# Patient Record
Sex: FEMALE | Race: OTHER | HISPANIC OR LATINO | Employment: OTHER | URBAN - METROPOLITAN AREA
[De-identification: names, ages, dates, MRNs, and addresses within clinical notes are randomized per-mention and may not be internally consistent; named-entity substitution may affect disease eponyms.]

---

## 2017-03-20 ENCOUNTER — HOSPITAL ENCOUNTER (OUTPATIENT)
Dept: RADIOLOGY | Facility: HOSPITAL | Age: 62
Discharge: HOME | End: 2017-03-20
Payer: MEDICARE

## 2017-03-20 ENCOUNTER — TRANSCRIBE ORDERS (OUTPATIENT)
Dept: RADIOLOGY | Facility: HOSPITAL | Age: 62
End: 2017-03-20

## 2017-03-20 DIAGNOSIS — M89.8X5 PAIN IN RIGHT FEMUR: Primary | ICD-10-CM

## 2017-03-20 DIAGNOSIS — M54.5 LOW BACK PAIN, UNSPECIFIED BACK PAIN LATERALITY, UNSPECIFIED CHRONICITY, WITH SCIATICA PRESENCE UNSPECIFIED: ICD-10-CM

## 2017-03-20 DIAGNOSIS — M89.8X5 PAIN IN RIGHT FEMUR: ICD-10-CM

## 2017-03-20 PROCEDURE — 73552 X-RAY EXAM OF FEMUR 2/>: CPT

## 2017-03-20 PROCEDURE — 72170 X-RAY EXAM OF PELVIS: CPT

## 2017-03-20 PROCEDURE — 72110 X-RAY EXAM L-2 SPINE 4/>VWS: CPT

## 2018-07-23 ENCOUNTER — ANESTHESIA EVENT (OUTPATIENT)
Dept: PERIOP | Facility: AMBULARY SURGERY CENTER | Age: 63
End: 2018-07-23
Payer: MEDICARE

## 2018-07-27 RX ORDER — DIAZEPAM 10 MG/1
5 TABLET ORAL EVERY 8 HOURS PRN
COMMUNITY

## 2018-07-27 NOTE — PRE-PROCEDURE INSTRUCTIONS
Pre-Surgery Instructions:   Medication Instructions    diazepam (VALIUM) 10 mg tablet Instructed patient per Anesthesia Guidelines   diclofenac sodium (VOLTAREN) 50 mg EC tablet Patient was instructed by Physician and understands      Pre op instructions reviewed-instructed to get instructions from Dr Minor Madsen office and follow the instructions including the bowel prep

## 2018-08-01 ENCOUNTER — ANESTHESIA (OUTPATIENT)
Dept: PERIOP | Facility: AMBULARY SURGERY CENTER | Age: 63
End: 2018-08-01
Payer: MEDICARE

## 2018-08-01 ENCOUNTER — HOSPITAL ENCOUNTER (OUTPATIENT)
Facility: AMBULARY SURGERY CENTER | Age: 63
Setting detail: OUTPATIENT SURGERY
Discharge: HOME | End: 2018-08-01
Attending: COLON & RECTAL SURGERY | Admitting: COLON & RECTAL SURGERY
Payer: MEDICARE

## 2018-08-01 VITALS
RESPIRATION RATE: 18 BRPM | WEIGHT: 178 LBS | HEART RATE: 60 BPM | SYSTOLIC BLOOD PRESSURE: 139 MMHG | BODY MASS INDEX: 34.95 KG/M2 | HEIGHT: 60 IN | OXYGEN SATURATION: 99 % | TEMPERATURE: 97 F | DIASTOLIC BLOOD PRESSURE: 62 MMHG

## 2018-08-01 DIAGNOSIS — Z12.11 ENCOUNTER FOR SCREENING FOR MALIGNANT NEOPLASM OF COLON: ICD-10-CM

## 2018-08-01 PROCEDURE — 88305 TISSUE EXAM BY PATHOLOGIST: CPT | Performed by: PATHOLOGY

## 2018-08-01 RX ORDER — LIDOCAINE HYDROCHLORIDE 10 MG/ML
INJECTION, SOLUTION INFILTRATION; PERINEURAL AS NEEDED
Status: DISCONTINUED | OUTPATIENT
Start: 2018-08-01 | End: 2018-08-01 | Stop reason: SURG

## 2018-08-01 RX ORDER — SODIUM CHLORIDE, SODIUM LACTATE, POTASSIUM CHLORIDE, CALCIUM CHLORIDE 600; 310; 30; 20 MG/100ML; MG/100ML; MG/100ML; MG/100ML
INJECTION, SOLUTION INTRAVENOUS CONTINUOUS PRN
Status: DISCONTINUED | OUTPATIENT
Start: 2018-08-01 | End: 2018-08-01 | Stop reason: SURG

## 2018-08-01 RX ORDER — PROPOFOL 10 MG/ML
INJECTION, EMULSION INTRAVENOUS AS NEEDED
Status: DISCONTINUED | OUTPATIENT
Start: 2018-08-01 | End: 2018-08-01 | Stop reason: SURG

## 2018-08-01 RX ADMIN — PROPOFOL 30 MG: 10 INJECTION, EMULSION INTRAVENOUS at 08:19

## 2018-08-01 RX ADMIN — SODIUM CHLORIDE, SODIUM LACTATE, POTASSIUM CHLORIDE, AND CALCIUM CHLORIDE: .6; .31; .03; .02 INJECTION, SOLUTION INTRAVENOUS at 08:05

## 2018-08-01 RX ADMIN — PROPOFOL 30 MG: 10 INJECTION, EMULSION INTRAVENOUS at 08:15

## 2018-08-01 RX ADMIN — PROPOFOL 20 MG: 10 INJECTION, EMULSION INTRAVENOUS at 08:22

## 2018-08-01 RX ADMIN — PROPOFOL 20 MG: 10 INJECTION, EMULSION INTRAVENOUS at 08:21

## 2018-08-01 RX ADMIN — LIDOCAINE HYDROCHLORIDE 50 MG: 10 INJECTION, SOLUTION INFILTRATION; PERINEURAL at 08:11

## 2018-08-01 RX ADMIN — PROPOFOL 100 MG: 10 INJECTION, EMULSION INTRAVENOUS at 08:11

## 2018-08-01 RX ADMIN — PROPOFOL 30 MG: 10 INJECTION, EMULSION INTRAVENOUS at 08:13

## 2018-08-01 RX ADMIN — PROPOFOL 20 MG: 10 INJECTION, EMULSION INTRAVENOUS at 08:24

## 2018-08-01 RX ADMIN — PROPOFOL 30 MG: 10 INJECTION, EMULSION INTRAVENOUS at 08:17

## 2018-08-01 NOTE — OP NOTE
Samira Esquivel 66 y o  female MRN: [id number]  :1955  Unit/Bed#: OR POOL Encounter: [id number]  18   @NOW    PCP: Nino Olivia DO    COLONOSCOPY    PROCEDURE: Colonoscopy/ Polypectomny (Cold Biopsy)    INDICATIONS: Screening for Colon Cancer    POST-OP DIAGNOSIS: See the impression below    SEDATION: Monitored anesthesia care, check anesthesia records    PHYSICAL EXAM:    /63   Pulse 66 Comment: SR  Temp (!) 97 3 °F (36 3 °C) (Temporal)   Resp 16   Ht 5' (1 524 m)   Wt 80 7 kg (178 lb)   SpO2 99%   BMI 34 76 kg/m²   Body mass index is 34 76 kg/m²  General: NAD  Heart: S1 & S2 normal, RRR  Lungs: CTA, No rales or rhonchi  Abdomen: Soft, nontender, nondistended, good bowel sounds    CONSENT:  Informed consent was obtained for the procedure, including sedation after explaining the risks and benefits of the procedure  Risks including but not limited to bleeding, perforation, infection, aspiration were discussed in detail  Also explained about less than 100%$ sensitivity with the exam and other alternatives  PREPARATION:   EKG tracing, pulse oximetry, blood pressure were monitored throughout the procedure  Patient was identified by myself both verbally and by visual inspection of ID band  DESCRIPTION:   Patient was placed in the left lateral decubitus position and was sedated with the above medication  Digital rectal examination was performed  The colonoscope was introduced in to the anal canal and advanced up to cecum, which was identified by the appendiceal orifice and IC valve  A careful inspection was made as the colonoscope was withdrawn, including a retroflexed view of the rectum; findings and interventions are described below  Appropriate photodocumentation was obtained  The quality of the colonic preparation was good  FINDINGS:    Small transverse colon polyp  Removed by cold biopsy technique  Placed in jar 1  Small descending colon polyp    Removed by cold biopsy technique  Placed in jar 2  IMPRESSIONS:      Small polyp x2    RECOMMENDATIONS:    Discharge to home  High-fiber diet  Follow up biopsy results in 2 weeks  Recall for colonoscopy in 5 years  COMPLICATIONS:  None; patient tolerated the procedure well      DISPOSITION: PACU           CONDITION: Stable

## 2018-08-01 NOTE — H&P
History and Physical   Colon and Rectal Surgery   Demetris Martinez 66 y o  female MRN: [id number]  Unit/Bed#: OR [place] Encounter: [id number]  18   @NOW    No chief complaint on file  History of Present Illness   HPI:  Demetris Martinez is a 66 y o  female who presents with screening colonoscopy  Denies any current symptoms  Last exam was over 5 years ago  Denies any family history  Historical Information   Past Medical History:   Diagnosis Date    Arthritis     GERD (gastroesophageal reflux disease)     Kidney stone     [facility]eningi[facility])     Patient states she has had no traetment for this     Past Surgical History:   Procedure Laterality Date    ABDOMINAL SURGERY      ANTERIOR CRUCIATE LIGAMENT REPAIR Right     CARPAL TUNNEL RELEASE Bilateral      SECTION      x2    ESOPHAGOGASTRODUODENOSCOPY      KIDNEY STONE SURGERY      ROTATOR CUFF REPAIR Right        Meds/Allergies     Prescriptions Prior to Admission   Medication    diazepam (VALIUM) 10 mg tablet    diclofenac sodium (VOLTAREN) 50 mg EC tablet    naproxen (NAPROSYN) 500 mg tablet       No current facility-administered medications for this encounter  Allergies   Allergen Reactions    Nucynta [Tapentadol] Anaphylaxis    Percocet [Oxycodone-Acetaminophen] Rash         Social History   History   Alcohol Use No     History   Drug Use No     History   Smoking Status    Former Smoker    Quit date:    Smokeless Tobacco    Never Used         Family History: History reviewed  No pertinent family history  Objective     Current Vitals:   Height: 5' (152 4 cm) (18 1359)  Weight - Scale: 82 6 kg (182 lb) (18 1359)  No intake or output data in the 24 hours ending 18 0738    Physical Exam:  General:  Resting comfortably in bed   Eyes:Sclera anicteric  ENT: Trachea midline  Pulm:  Symmetric chest raise    No respiratory Distress  CV:  Regular on monitor  Abdomen:  Soft NT ND  Extremities:  No clubbing/ cyanosis/ edema    Lab Results: I have personally reviewed pertinent lab results  Imaging: I have personally reviewed pertinent reports  ASSESSMENT:  Ginny Garcia is a 66 y o  female who presents for outpatient colonoscopy  PLAN:  For colonoscopy    Risks/ Benefits reviewed to include but not limited to anesthesia, bleeding, missed lesions, and colonoscopic perforation requiring surgery

## 2018-08-01 NOTE — ANESTHESIA POSTPROCEDURE EVALUATION
Post-Op Assessment Note      CV Status:  Stable    Mental Status:  Somnolent    Hydration Status:  Euvolemic    PONV Controlled:  Controlled    Airway Patency:  Patent    Post Op Vitals Reviewed: Yes          Staff: Anesthesiologist       Comments: vss, report to rn          BP      Temp     Pulse     Resp      SpO2

## 2018-08-01 NOTE — DISCHARGE INSTRUCTIONS
Colonoscopy   WHAT YOU NEED TO KNOW:   A colonoscopy is a procedure to examine the inside of your colon (intestine) with a scope  Polyps or tissue growths may have been removed during your colonoscopy  It is normal to feel bloated and to have some abdominal discomfort  You should be passing gas  If you have hemorrhoids or you had polyps removed, you may have a small amount of bleeding  DISCHARGE INSTRUCTIONS:   Seek care immediately if:   · You have a large amount of bright red blood in your bowel movements  · Your abdomen is hard and firm and you have severe pain  · You have sudden trouble breathing  Contact your healthcare provider if:   · You develop a rash or hives  · You have a fever within 24 hours of your procedure       · You have not had a bowel movement for 3 days after your procedure  · You have questions or concerns about your condition or care  Activity:   · Do not lift, strain, or run  for 3 days after your procedure  · Rest after your procedure  You have been given medicine to relax you  Do not  drive or make important decisions until the day after your procedure  Return to your normal activity as directed  · Relieve gas and discomfort from bloating  by lying on your right side with a heating pad on your abdomen  You may need to take short walks to help the gas move out  Eat small meals until bloating is relieved  If you had polyps removed: For 7 days after your procedure:  · Do not  take aspirin  · Do not  go on long car rides  Follow up with your healthcare provider as directed:  Write down your questions so you remember to ask them during your visits  © 2017 [employer] is for End User's use only and may not be sold, redistributed or otherwise used for commercial purposes  All illustrations and images included in CareNotes® are the copyrighted property of [employer]  or Olayinka Castañeda    The above information is an  only  It is not intended as medical advice for individual conditions or treatments  Talk to your doctor, nurse or pharmacist before following any medical regimen to see if it is safe and effective for you  Colorectal Polyps   WHAT YOU NEED TO KNOW:   Colorectal polyps are small growths of tissue in the lining of the colon and rectum  Most polyps are hyperplastic polyps and are usually benign (noncancerous)  Certain types of polyps, called adenomatous polyps, may turn into cancer  DISCHARGE INSTRUCTIONS:   Follow up with your healthcare provider or gastroenterologist as directed: You may need to return for more tests, such as another colonoscopy  Write down your questions so you remember to ask them during your visits  Reduce your risk for colorectal polyps:   · Eat a variety of healthy foods:  Healthy foods include fruit, vegetables, whole-grain breads, low-fat dairy products, beans, lean meat, and fish  Ask if you need to be on a special diet  · Maintain a healthy weight:  Ask your healthcare provider if you need to lose weight and how much you need to lose  Ask for help with a weight loss program     · Exercise:  Begin to exercise slowly and do more as you get stronger  Talk with your healthcare provider before you start an exercise program      · Limit alcohol:  Your risk for polyps increases the more you drink  · Do not smoke: If you smoke, it is never too late to quit  Ask for information about how to stop  For support and more information:   · Mach[employer] [facility]ccle[facility] ([facility]) [address], [place] [address]  Phone: 2- [phone] - [phone]  Web Address: www digestive  niddk nih gov  Contact your healthcare provider or gastroenterologist if:   · You have a fever  · You have chills, a cough, or feel weak and achy  · You have abdominal pain that does not go away or gets worse after you take medicine  · Your abdomen is swollen  · You are losing weight without trying  · You have questions or concerns about your condition or care  Seek care immediately or call 911 if:   · You have sudden shortness of breath  · You have a fast heart rate, fast breathing, or are too dizzy to stand up  · You have severe abdominal pain  · You see blood in your bowel movement  © 2017 [address] [employer] Information is for End User's use only and may not be sold, redistributed or otherwise used for commercial purposes  All illustrations and images included in CareNotes® are the copyrighted property of [employer]  or Olayinka Castañeda  The above information is an  only  It is not intended as medical advice for individual conditions or treatments  Talk to your doctor, nurse or pharmacist before following any medical regimen to see if it is safe and effective for you

## 2018-08-01 NOTE — ANESTHESIA PREPROCEDURE EVALUATION
Review of Systems/Medical History      No history of anesthetic complications     Cardiovascular  Negative cardio ROS    Pulmonary  Negative pulmonary ROS        GI/Hepatic    GERD ,        Kidney stones,        Endo/Other  Negative endo/other ROS      GYN      Comment: Post-menopausal     Hematology  Negative hematology ROS      Musculoskeletal    Arthritis     Neurology      Comment: Meningioma - no issues Psychology   Negative psychology ROS              Physical Exam    Airway       Dental       Cardiovascular  Comment: Negative ROS,     Pulmonary      Other Findings        Anesthesia Plan  ASA Score- 2     Anesthesia Type- IV sedation with anesthesia with ASA Monitors  Additional Monitors:   Airway Plan:     Comment: IV sedation,  standard ASA monitors  Risks and benefits discussed with patient; patient consented and agrees to proceed  I saw and evaluated the patient  If seen with CRNA, we have discussed the anesthetic plan and I am in agreement that the plan is appropriate for the patient        Plan Factors-    Induction- intravenous  Postoperative Plan-     Informed Consent- Anesthetic plan and risks discussed with patient

## 2018-11-29 ENCOUNTER — TRANSCRIBE ORDERS (OUTPATIENT)
Dept: LAB | Facility: HOSPITAL | Age: 63
End: 2018-11-29

## 2018-11-29 ENCOUNTER — APPOINTMENT (OUTPATIENT)
Dept: LAB | Facility: HOSPITAL | Age: 63
End: 2018-11-29
Payer: MEDICARE

## 2018-11-29 DIAGNOSIS — M12.9 ACUTE ARTHROPATHY: Primary | ICD-10-CM

## 2018-11-29 LAB
BASOPHILS # BLD AUTO: 0.04 THOUSANDS/ΜL (ref 0–0.1)
BASOPHILS NFR BLD AUTO: 1 % (ref 0–1)
CK SERPL-CCNC: 112 U/L (ref 26–192)
CRP SERPL QL: 8.5 MG/L
EOSINOPHIL # BLD AUTO: 0.28 THOUSAND/ΜL (ref 0–0.61)
EOSINOPHIL NFR BLD AUTO: 3 % (ref 0–6)
ERYTHROCYTE [DISTWIDTH] IN BLOOD BY AUTOMATED COUNT: 13.8 % (ref 11.6–15.1)
HCT VFR BLD AUTO: 42.5 % (ref 34.8–46.1)
HGB BLD-MCNC: 13.7 G/DL (ref 11.5–15.4)
IMM GRANULOCYTES # BLD AUTO: 0.03 THOUSAND/UL (ref 0–0.2)
IMM GRANULOCYTES NFR BLD AUTO: 0 % (ref 0–2)
LYMPHOCYTES # BLD AUTO: 2.86 THOUSANDS/ΜL (ref 0.6–4.47)
LYMPHOCYTES NFR BLD AUTO: 33 % (ref 14–44)
MCH RBC QN AUTO: 28.6 PG (ref 26.8–34.3)
MCHC RBC AUTO-ENTMCNC: 32.2 G/DL (ref 31.4–37.4)
MCV RBC AUTO: 89 FL (ref 82–98)
MONOCYTES # BLD AUTO: 0.6 THOUSAND/ΜL (ref 0.17–1.22)
MONOCYTES NFR BLD AUTO: 7 % (ref 4–12)
NEUTROPHILS # BLD AUTO: 4.94 THOUSANDS/ΜL (ref 1.85–7.62)
NEUTS SEG NFR BLD AUTO: 56 % (ref 43–75)
NRBC BLD AUTO-RTO: 0 /100 WBCS
PLATELET # BLD AUTO: 369 THOUSANDS/UL (ref 149–390)
PMV BLD AUTO: 9.4 FL (ref 8.9–12.7)
RBC # BLD AUTO: 4.79 MILLION/UL (ref 3.81–5.12)
TSH SERPL DL<=0.05 MIU/L-ACNC: 1.51 UIU/ML (ref 0.36–3.74)
WBC # BLD AUTO: 8.75 THOUSAND/UL (ref 4.31–10.16)

## 2018-11-29 PROCEDURE — 82550 ASSAY OF CK (CPK): CPT

## 2018-11-29 PROCEDURE — 86140 C-REACTIVE PROTEIN: CPT

## 2018-11-29 PROCEDURE — 36415 COLL VENOUS BLD VENIPUNCTURE: CPT

## 2018-11-29 PROCEDURE — 85025 COMPLETE CBC W/AUTO DIFF WBC: CPT

## 2018-11-29 PROCEDURE — 84443 ASSAY THYROID STIM HORMONE: CPT

## 2019-06-09 ENCOUNTER — HOSPITAL ENCOUNTER (EMERGENCY)
Facility: HOSPITAL | Age: 64
Discharge: HOME | End: 2019-06-09
Attending: EMERGENCY MEDICINE | Admitting: EMERGENCY MEDICINE
Payer: MEDICARE

## 2019-06-09 VITALS
TEMPERATURE: 97 F | OXYGEN SATURATION: 98 % | RESPIRATION RATE: 18 BRPM | HEART RATE: 69 BPM | DIASTOLIC BLOOD PRESSURE: 93 MMHG | SYSTOLIC BLOOD PRESSURE: 125 MMHG

## 2019-06-09 DIAGNOSIS — S01.01XA LACERATION OF SCALP, INITIAL ENCOUNTER: Primary | ICD-10-CM

## 2019-06-09 PROCEDURE — 90715 TDAP VACCINE 7 YRS/> IM: CPT | Performed by: EMERGENCY MEDICINE

## 2019-06-09 PROCEDURE — 99282 EMERGENCY DEPT VISIT SF MDM: CPT

## 2019-06-09 PROCEDURE — 99283 EMERGENCY DEPT VISIT LOW MDM: CPT | Performed by: EMERGENCY MEDICINE

## 2019-06-09 PROCEDURE — 90471 IMMUNIZATION ADMIN: CPT

## 2019-06-09 RX ORDER — LIDOCAINE HYDROCHLORIDE 10 MG/ML
5 INJECTION, SOLUTION EPIDURAL; INFILTRATION; INTRACAUDAL; PERINEURAL ONCE
Status: COMPLETED | OUTPATIENT
Start: 2019-06-09 | End: 2019-06-09

## 2019-06-09 RX ADMIN — LIDOCAINE HYDROCHLORIDE 5 ML: 10 INJECTION, SOLUTION EPIDURAL; INFILTRATION; INTRACAUDAL; PERINEURAL at 21:59

## 2019-06-09 RX ADMIN — TETANUS TOXOID, REDUCED DIPHTHERIA TOXOID AND ACELLULAR PERTUSSIS VACCINE, ADSORBED 0.5 ML: 5; 2.5; 8; 8; 2.5 SUSPENSION INTRAMUSCULAR at 22:00

## 2020-07-30 ENCOUNTER — HOSPITAL ENCOUNTER (EMERGENCY)
Facility: HOSPITAL | Age: 65
Discharge: HOME | End: 2020-07-31
Attending: EMERGENCY MEDICINE | Admitting: EMERGENCY MEDICINE
Payer: MEDICARE

## 2020-07-30 VITALS
DIASTOLIC BLOOD PRESSURE: 79 MMHG | OXYGEN SATURATION: 99 % | SYSTOLIC BLOOD PRESSURE: 167 MMHG | WEIGHT: 178 LBS | HEART RATE: 76 BPM | RESPIRATION RATE: 18 BRPM | BODY MASS INDEX: 34.76 KG/M2 | TEMPERATURE: 98.1 F

## 2020-07-30 DIAGNOSIS — K08.89 DENTALGIA: Primary | ICD-10-CM

## 2020-07-30 PROCEDURE — 99284 EMERGENCY DEPT VISIT MOD MDM: CPT | Performed by: EMERGENCY MEDICINE

## 2020-07-30 PROCEDURE — 99282 EMERGENCY DEPT VISIT SF MDM: CPT

## 2020-07-30 RX ORDER — IBUPROFEN 400 MG/1
400 TABLET ORAL EVERY 6 HOURS PRN
Qty: 90 TABLET | Refills: 0 | Status: SHIPPED | OUTPATIENT
Start: 2020-07-30

## 2020-07-30 RX ORDER — CHLORHEXIDINE GLUCONATE 0.12 MG/ML
15 RINSE ORAL 2 TIMES DAILY
Qty: 120 ML | Refills: 0 | Status: SHIPPED | OUTPATIENT
Start: 2020-07-30

## 2020-07-31 NOTE — ED ATTENDING ATTESTATION
7/30/2020  Eloy Marvin DO, saw and evaluated the patient  I have discussed the patient with the resident/non-physician practitioner and agree with the resident's/non-physician practitioner's findings, Plan of Care, and MDM as documented in the resident's/non-physician practitioner's note, except where noted  All available labs and Radiology studies were reviewed  I was present for key portions of any procedure(s) performed by the resident/non-physician practitioner and I was immediately available to provide assistance  At this point I agree with the current assessment done in the Emergency Department  I have conducted an independent evaluation of this patient a history and physical is as follows:    62 yo female c/o over 1 mo R upper toothache near #7, worse over the past couple days  Pain severe at times, worse with hot/cold exposure  Denies fever, swelling, difficulty eating, swallowing, or breathing  #7 is TTP  No obvious intraoral abscess  No trismus  Neck supple no meningismus  No stridor  Submandibular space is soft  Uvula midline  No facial swelling  Imp: toothache, unclear exact etiology  Likely carries plan: peridex, ibuprofen  Pt given information to call dental clinic        ED Course         Critical Care Time  Procedures

## 2020-07-31 NOTE — ED PROVIDER NOTES
History  Chief Complaint   Patient presents with    Dental Pain     Began with dental pain (top right) approximately 4 weeks ago  Pain has significantly increased in the past 24 hours  Has tried over the coutre medications without relief  Having difficulty getting an appointment with a dentist ( one month out)  61yo female presents for dental pain  She initially injured her tooth while eating eating about month ago, she previously had a filling in her right upper molar tooth however states it began to fall out with the injury  She has been using tylenol for pain however today had increased pain  Family tried using oragel today  She states radiation to forehead and ear  No facial swelling or erythema  Denies fevers, chill, difficulty swallowing, difficulty breathing, difficulty turning neck or opening mouth  She tried calling a dentist today however cannot be seen for weeks  Prior to Admission Medications   Prescriptions Last Dose Informant Patient Reported?  Taking?   diazepam (VALIUM) 10 mg tablet   Yes No   Sig: Take 5 mg by mouth every 8 (eight) hours as needed for anxiety   diclofenac sodium (VOLTAREN) 50 mg EC tablet   Yes No   Sig: Take 50 mg by mouth 2 (two) times a day   naproxen (NAPROSYN) 500 mg tablet   No No   Sig: Take 1 tablet by mouth 2 (two) times a day with meals for 15 days   Patient taking differently: Take 500 mg by mouth as needed        Facility-Administered Medications: None       Past Medical History:   Diagnosis Date    Arthritis     GERD (gastroesophageal reflux disease)     Kidney stone     Meningioma [facility])     Patient states she has had no traetment for this       Past Surgical History:   Procedure Laterality Date    ABDOMINAL SURGERY      ANTERIOR CRUCIATE LIGAMENT REPAIR Right     CARPAL TUNNEL RELEASE Bilateral      SECTION      x2    ESOPHAGOGASTRODUODENOSCOPY      KIDNEY STONE SURGERY      [facility] COLONOSCOPY FLX DX W/COLLJ SPEC WHEN PFRMD N/A 2018 Procedure: COLONOSCOPY;  Surgeon: Dimas Dugan MD;  [facility]; Service: Colorectal    ROTATOR CUFF REPAIR Right        History reviewed  No pertinent family history  I have reviewed and agree with the history as documented  E-Cigarette/Vaping    E-Cigarette Use Never User      E-Cigarette/Vaping Substances     Social History     Tobacco Use    Smoking status: Former Smoker     Last attempt to quit:      Years since quittin 5    Smokeless tobacco: Never Used   Substance Use Topics    Alcohol use: No    Drug use: No        Review of Systems   Constitutional: Negative for appetite change, chills and fever  HENT: Positive for dental problem and ear pain  Negative for facial swelling and trouble swallowing  Respiratory: Negative for shortness of breath  Cardiovascular: Negative for chest pain  Gastrointestinal: Negative for nausea and vomiting  Musculoskeletal: Negative for neck pain and neck stiffness  Skin: Negative for color change  All other systems reviewed and are negative  Physical Exam  ED Triage Vitals   Temperature Pulse Respirations Blood Pressure SpO2   209 20 2325 20 2325 20 23220   98 1 °F (36 7 °C) 76 18 167/79 99 %      Temp Source Heart Rate Source Patient Position - Orthostatic VS BP Location FiO2 (%)   20 -- -- -- --   Oral          Pain Score       20       Worst Possible Pain             Orthostatic Vital Signs  Vitals:    20   BP: 167/79   Pulse: 76       Physical Exam   Constitutional: She appears well-developed and well-nourished  No distress  HENT:   Head: Normocephalic and atraumatic  Right Ear: Tympanic membrane, external ear and ear canal normal    Left Ear: Tympanic membrane, external ear and ear canal normal    Nose: Nose normal    Mouth/Throat: Uvula is midline and oropharynx is clear and moist  No oral lesions  No trismus in the jaw  Abnormal dentition  No uvula swelling  No posterior oropharyngeal edema, posterior oropharyngeal erythema or tonsillar abscesses  No tonsillar exudate  Abnormal dentition with several missing teeth, pain around right upper molar area to tooth #1-2 positioning, no palpable abscess felt, soft sublingual space   Eyes: Right eye exhibits no discharge  Left eye exhibits no discharge  No scleral icterus  Cardiovascular: Normal rate and regular rhythm  Pulmonary/Chest: Effort normal and breath sounds normal  No respiratory distress  Neurological: She is alert  She exhibits normal muscle tone  Coordination normal    Skin: Skin is warm  She is not diaphoretic  No erythema  Vitals reviewed  ED Medications  Medications - No data to display    Diagnostic Studies  Results Reviewed     None                 No orders to display         Procedures  Procedures      ED Course                                           MDM  Number of Diagnoses or Management Options  Dentalgia:   Diagnosis management comments: 61yo female presents for dental pain, she had orginally injured her tooth about a month ago and today experienced increased pain  She has no concerning signs/symptoms such as facial swelling, erythema, trismus, difficulty swallowing or breathing, fevers, chills  Patient was offered nerve block however declined  She was advised to use ibuprofen for pain control and given rx for peridex  She was provided  dental clinic resource page and discharged to home  Disposition  Final diagnoses:   Manuel Franco     Time reflects when diagnosis was documented in both MDM as applicable and the Disposition within this note     Time User Action Codes Description Comment    7/30/2020 11:25 PM Jocelyne Fisher Add [M80 [id number]] Manuel Franco       ED Disposition     ED Disposition Condition Date/Time Comment    Discharge Stable u Jul 30, 2020 11:25 PM Yessi Jacobs discharge to home/self care              Follow-up Information     Follow up With Specialties Details Why Mian Zimmerman [address]  [phone]          Discharge Medication List as of 7/30/2020 11:50 PM      START taking these medications    Details   chlorhexidine (PERIDEX) 0 12 % solution Apply 15 mL to the mouth or throat 2 (two) times a day, Starting u 7/30/2020, Print      ibuprofen (MOTRIN) 400 mg tablet Take 1 tablet (400 mg total) by mouth every 6 (six) hours as needed for mild pain or moderate pain, Starting u 7/30/2020, Print         CONTINUE these medications which have NOT CHANGED    Details   diazepam (VALIUM) 10 mg tablet Take 5 mg by mouth every 8 (eight) hours as needed for anxiety, Historical Med      diclofenac sodium (VOLTAREN) 50 mg EC tablet Take 50 mg by mouth 2 (two) times a day, Historical Med      naproxen (NAPROSYN) 500 mg tablet Take 1 tablet by mouth 2 (two) times a day with meals for 15 days, Starting 12/25/2016, Until Mon 1/9/17, Print           No discharge procedures on file  PDMP Review     None           ED Provider  Attending physically available and evaluated Amilcar Diane I managed the patient along with the ED Attending      Electronically Signed by         Ritika Mejia DO  07/31/20 [id number]

## 2021-02-10 ENCOUNTER — HOSPITAL ENCOUNTER (OUTPATIENT)
Dept: RADIOLOGY | Facility: HOSPITAL | Age: 66
Discharge: HOME | End: 2021-02-10
Payer: MEDICARE

## 2021-02-10 ENCOUNTER — TRANSCRIBE ORDERS (OUTPATIENT)
Dept: RADIOLOGY | Facility: HOSPITAL | Age: 66
End: 2021-02-10

## 2021-02-10 DIAGNOSIS — R05.9 COUGH: ICD-10-CM

## 2021-02-10 DIAGNOSIS — R05.9 COUGH: Primary | ICD-10-CM

## 2021-02-10 PROCEDURE — 71046 X-RAY EXAM CHEST 2 VIEWS: CPT

## 2022-03-07 ENCOUNTER — HOSPITAL ENCOUNTER (OUTPATIENT)
Dept: RADIOLOGY | Age: 67
Discharge: HOME | End: 2022-03-07
Payer: MEDICARE

## 2022-03-07 DIAGNOSIS — N20.0 CALCULUS OF KIDNEY: ICD-10-CM

## 2022-03-07 PROCEDURE — 76770 US EXAM ABDO BACK WALL COMP: CPT

## 2022-09-01 ENCOUNTER — OFFICE VISIT (OUTPATIENT)
Dept: DENTISTRY | Facility: CLINIC | Age: 67
End: 2022-09-01

## 2022-09-01 VITALS — SYSTOLIC BLOOD PRESSURE: 136 MMHG | DIASTOLIC BLOOD PRESSURE: 78 MMHG

## 2022-09-01 DIAGNOSIS — Z00.00 ENCOUNTER FOR SCREENING AND PREVENTATIVE CARE: Primary | ICD-10-CM

## 2022-09-01 PROBLEM — M54.9 DORSALGIA: Status: ACTIVE | Noted: 2022-09-01

## 2022-09-01 PROBLEM — M19.90 OSTEOARTHRITIS: Status: ACTIVE | Noted: 2017-02-28

## 2022-09-01 PROBLEM — R05.9 COUGH: Status: ACTIVE | Noted: 2022-09-01

## 2022-09-01 PROBLEM — J32.9 SINUSITIS: Status: ACTIVE | Noted: 2022-09-01

## 2022-09-01 PROBLEM — K21.9 GASTRO-ESOPHAGEAL REFLUX DISEASE WITHOUT ESOPHAGITIS: Status: ACTIVE | Noted: 2022-09-01

## 2022-09-01 PROCEDURE — D0220 INTRAORAL - PERIAPICAL FIRST RADIOGRAPHIC IMAGE: HCPCS

## 2022-09-01 RX ORDER — TAMSULOSIN HYDROCHLORIDE 0.4 MG/1
1 CAPSULE ORAL DAILY
COMMUNITY
Start: 2022-05-31

## 2022-09-01 RX ORDER — AMOXICILLIN 500 MG/1
500 CAPSULE ORAL EVERY 8 HOURS SCHEDULED
COMMUNITY

## 2022-09-01 RX ORDER — IBUPROFEN 600 MG/1
600 TABLET ORAL EVERY 6 HOURS PRN
COMMUNITY

## 2022-09-01 RX ORDER — MELOXICAM 7.5 MG/1
TABLET ORAL
COMMUNITY
Start: 2022-08-03

## 2022-09-01 NOTE — PROGRESS NOTES
Reviewed medical history with pt  CC: terrible pain #28  Saw private dentist who took pa, gave script Amoxicillin 500 mg and ibuprofen 600mg  Hx  Throwing up, can't sleep at night    Pa #28, Limited exam  Dr Cervantes Neither examined: we can do ext here in clinic  Once healed, can schedule for Comp , fmx  No swelling, EO exam: enlarged submandibular lymph node  Instructed pt to finish taking ab  NV: ext   #28  NVV: Comp exam, FMX, PC

## 2022-09-26 ENCOUNTER — OFFICE VISIT (OUTPATIENT)
Dept: DENTISTRY | Facility: CLINIC | Age: 67
End: 2022-09-26

## 2022-09-26 VITALS — HEART RATE: 71 BPM | SYSTOLIC BLOOD PRESSURE: 129 MMHG | DIASTOLIC BLOOD PRESSURE: 65 MMHG

## 2022-09-26 DIAGNOSIS — Z01.20 ENCOUNTER FOR DENTAL EXAMINATION: Primary | ICD-10-CM

## 2022-09-26 PROCEDURE — D7140 EXTRACTION, ERUPTED TOOTH OR EXPOSED ROOT (ELEVATION AND/OR FORCEPS REMOVAL): HCPCS | Performed by: DENTIST

## 2022-09-26 NOTE — PROGRESS NOTES
Oral Surgery    Pancho Alejandro presents for Ext 29    [address], patient denies any changes  Obtained a direct and personal consent  Risks and complications were explained  Pt agreed and consented  Consent scanned in doc center  Pre-Op BP WNL  Administered cc of 2 % Lidocaine w/ 1:100,000 epi via block and 1 5 cc of 4% articaine with 1:100,000 epi via local infiltration  Adequate anesthesia obtained, reflected gingiva, elevated, and extracted #28  Socket irrigated, and gauze applied  Upon dismissal, patient received POI, ice, gauze       NV: comp exam

## 2022-10-31 PROBLEM — R05.9 COUGH: Status: RESOLVED | Noted: 2022-09-01 | Resolved: 2022-10-31

## 2022-10-31 PROBLEM — J32.9 SINUSITIS: Status: RESOLVED | Noted: 2022-09-01 | Resolved: 2022-10-31

## 2022-11-18 ENCOUNTER — APPOINTMENT (OUTPATIENT)
Dept: LAB | Facility: CLINIC | Age: 67
End: 2022-11-18

## 2022-11-18 ENCOUNTER — HOSPITAL ENCOUNTER (OUTPATIENT)
Dept: RADIOLOGY | Facility: HOSPITAL | Age: 67
Discharge: HOME | End: 2022-11-18

## 2022-11-18 DIAGNOSIS — Z13.228 SCREENING FOR PHENYLKETONURIA (PKU): ICD-10-CM

## 2022-11-18 DIAGNOSIS — R31.9 HEMATURIA SYNDROME: ICD-10-CM

## 2022-11-18 DIAGNOSIS — Z13.6 SCREENING FOR ISCHEMIC HEART DISEASE: ICD-10-CM

## 2022-11-18 DIAGNOSIS — R81 GLYCOSURIA: ICD-10-CM

## 2022-11-18 LAB
ALBUMIN SERPL BCP-MCNC: 3.2 G/DL (ref 3.5–5)
ALP SERPL-CCNC: 101 U/L (ref 46–116)
ALT SERPL W P-5'-P-CCNC: 24 U/L (ref 12–78)
ANION GAP SERPL CALCULATED.3IONS-SCNC: 4 MMOL/L (ref 4–13)
AST SERPL W P-5'-P-CCNC: 14 U/L (ref 5–45)
BASOPHILS # BLD AUTO: 0.03 THOUSANDS/ÂΜL (ref 0–0.1)
BASOPHILS NFR BLD AUTO: 0 % (ref 0–1)
BILIRUB SERPL-MCNC: 0.45 MG/DL (ref 0.2–1)
BUN SERPL-MCNC: 12 MG/DL (ref 5–25)
CALCIUM ALBUM COR SERPL-MCNC: 9.4 MG/DL (ref 8.3–10.1)
CALCIUM SERPL-MCNC: 8.8 MG/DL (ref 8.3–10.1)
CHLORIDE SERPL-SCNC: 105 MMOL/L (ref 96–108)
CHOLEST SERPL-MCNC: 212 MG/DL
CO2 SERPL-SCNC: 29 MMOL/L (ref 21–32)
CREAT SERPL-MCNC: 0.73 MG/DL (ref 0.6–1.3)
EOSINOPHIL # BLD AUTO: 0.26 THOUSAND/ÂΜL (ref 0–0.61)
EOSINOPHIL NFR BLD AUTO: 3 % (ref 0–6)
ERYTHROCYTE [DISTWIDTH] IN BLOOD BY AUTOMATED COUNT: 13.2 % (ref 11.6–15.1)
EST. AVERAGE GLUCOSE BLD GHB EST-MCNC: 217 MG/DL
GFR SERPL CREATININE-BSD FRML MDRD: 86 ML/MIN/1.73SQ M
GLUCOSE P FAST SERPL-MCNC: 181 MG/DL (ref 65–99)
HBA1C MFR BLD: 9.2 %
HCT VFR BLD AUTO: 42.6 % (ref 34.8–46.1)
HDLC SERPL-MCNC: 52 MG/DL
HGB BLD-MCNC: 14 G/DL (ref 11.5–15.4)
IMM GRANULOCYTES # BLD AUTO: 0.03 THOUSAND/UL (ref 0–0.2)
IMM GRANULOCYTES NFR BLD AUTO: 0 % (ref 0–2)
LDLC SERPL CALC-MCNC: 136 MG/DL (ref 0–100)
LYMPHOCYTES # BLD AUTO: 3.14 THOUSANDS/ÂΜL (ref 0.6–4.47)
LYMPHOCYTES NFR BLD AUTO: 32 % (ref 14–44)
MCH RBC QN AUTO: 28.9 PG (ref 26.8–34.3)
MCHC RBC AUTO-ENTMCNC: 32.9 G/DL (ref 31.4–37.4)
MCV RBC AUTO: 88 FL (ref 82–98)
MONOCYTES # BLD AUTO: 0.64 THOUSAND/ÂΜL (ref 0.17–1.22)
MONOCYTES NFR BLD AUTO: 7 % (ref 4–12)
NEUTROPHILS # BLD AUTO: 5.7 THOUSANDS/ÂΜL (ref 1.85–7.62)
NEUTS SEG NFR BLD AUTO: 58 % (ref 43–75)
NONHDLC SERPL-MCNC: 160 MG/DL
NRBC BLD AUTO-RTO: 0 /100 WBCS
PLATELET # BLD AUTO: 382 THOUSANDS/UL (ref 149–390)
PMV BLD AUTO: 9 FL (ref 8.9–12.7)
POTASSIUM SERPL-SCNC: 4.3 MMOL/L (ref 3.5–5.3)
PROT SERPL-MCNC: 7 G/DL (ref 6.4–8.4)
RBC # BLD AUTO: 4.85 MILLION/UL (ref 3.81–5.12)
SODIUM SERPL-SCNC: 138 MMOL/L (ref 135–147)
TRIGL SERPL-MCNC: 118 MG/DL
WBC # BLD AUTO: 9.8 THOUSAND/UL (ref 4.31–10.16)

## 2023-04-28 ENCOUNTER — APPOINTMENT (EMERGENCY)
Dept: RADIOLOGY | Facility: HOSPITAL | Age: 68
End: 2023-04-28

## 2023-04-28 ENCOUNTER — ANESTHESIA EVENT (OUTPATIENT)
Dept: PERIOP | Facility: HOSPITAL | Age: 68
End: 2023-04-28

## 2023-04-28 ENCOUNTER — ANESTHESIA (OUTPATIENT)
Dept: PERIOP | Facility: HOSPITAL | Age: 68
End: 2023-04-28

## 2023-04-28 ENCOUNTER — HOSPITAL ENCOUNTER (OUTPATIENT)
Facility: HOSPITAL | Age: 68
Setting detail: OUTPATIENT SURGERY
Discharge: HOME | End: 2023-04-29
Attending: EMERGENCY MEDICINE | Admitting: SURGERY

## 2023-04-28 DIAGNOSIS — K81.0 CHOLECYSTITIS, ACUTE: Primary | ICD-10-CM

## 2023-04-28 DIAGNOSIS — K80.20 CHOLELITHIASIS: ICD-10-CM

## 2023-04-28 DIAGNOSIS — R10.13 ACUTE EPIGASTRIC PAIN: ICD-10-CM

## 2023-04-28 DIAGNOSIS — K80.50 BILIARY COLIC: ICD-10-CM

## 2023-04-28 DIAGNOSIS — K80.00 ACUTE CALCULOUS CHOLECYSTITIS: ICD-10-CM

## 2023-04-28 LAB
2HR DELTA HS TROPONIN: >2 NG/L
4HR DELTA HS TROPONIN: >2 NG/L
ALBUMIN SERPL BCP-MCNC: 3.5 G/DL (ref 3.5–5)
ALP SERPL-CCNC: 132 U/L (ref 46–116)
ALT SERPL W P-5'-P-CCNC: 34 U/L (ref 12–78)
ANION GAP SERPL CALCULATED.3IONS-SCNC: 3 MMOL/L (ref 4–13)
AST SERPL W P-5'-P-CCNC: 22 U/L (ref 5–45)
ATRIAL RATE: 72 BPM
BACTERIA UR QL AUTO: ABNORMAL /HPF
BASOPHILS # BLD AUTO: 0.04 THOUSANDS/ΜL (ref 0–0.1)
BASOPHILS NFR BLD AUTO: 0 % (ref 0–1)
BILIRUB SERPL-MCNC: 0.25 MG/DL (ref 0.2–1)
BILIRUB UR QL STRIP: NEGATIVE
BUN SERPL-MCNC: 14 MG/DL (ref 5–25)
CALCIUM SERPL-MCNC: 9.8 MG/DL (ref 8.3–10.1)
CARDIAC TROPONIN I PNL SERPL HS: 4 NG/L
CARDIAC TROPONIN I PNL SERPL HS: 4 NG/L
CARDIAC TROPONIN I PNL SERPL HS: <2 NG/L
CHLORIDE SERPL-SCNC: 102 MMOL/L (ref 96–108)
CLARITY UR: CLEAR
CO2 SERPL-SCNC: 28 MMOL/L (ref 21–32)
COLOR UR: YELLOW
CREAT SERPL-MCNC: 0.8 MG/DL (ref 0.6–1.3)
EOSINOPHIL # BLD AUTO: 0.26 THOUSAND/ΜL (ref 0–0.61)
EOSINOPHIL NFR BLD AUTO: 3 % (ref 0–6)
ERYTHROCYTE [DISTWIDTH] IN BLOOD BY AUTOMATED COUNT: 13 % (ref 11.6–15.1)
GFR SERPL CREATININE-BSD FRML MDRD: 77 ML/MIN/1.73SQ M
GLUCOSE SERPL-MCNC: 290 MG/DL (ref 65–140)
GLUCOSE UR STRIP-MCNC: ABNORMAL MG/DL
HCT VFR BLD AUTO: 44.6 % (ref 34.8–46.1)
HGB BLD-MCNC: 14.7 G/DL (ref 11.5–15.4)
HGB UR QL STRIP.AUTO: ABNORMAL
IMM GRANULOCYTES # BLD AUTO: 0.03 THOUSAND/UL (ref 0–0.2)
IMM GRANULOCYTES NFR BLD AUTO: 0 % (ref 0–2)
KETONES UR STRIP-MCNC: NEGATIVE MG/DL
LEUKOCYTE ESTERASE UR QL STRIP: NEGATIVE
LIPASE SERPL-CCNC: 134 U/L (ref 73–393)
LYMPHOCYTES # BLD AUTO: 2.21 THOUSANDS/ΜL (ref 0.6–4.47)
LYMPHOCYTES NFR BLD AUTO: 22 % (ref 14–44)
MCH RBC QN AUTO: 28.6 PG (ref 26.8–34.3)
MCHC RBC AUTO-ENTMCNC: 33 G/DL (ref 31.4–37.4)
MCV RBC AUTO: 87 FL (ref 82–98)
MONOCYTES # BLD AUTO: 0.57 THOUSAND/ΜL (ref 0.17–1.22)
MONOCYTES NFR BLD AUTO: 6 % (ref 4–12)
MUCOUS THREADS UR QL AUTO: ABNORMAL
NEUTROPHILS # BLD AUTO: 7.08 THOUSANDS/ΜL (ref 1.85–7.62)
NEUTS SEG NFR BLD AUTO: 69 % (ref 43–75)
NITRITE UR QL STRIP: NEGATIVE
NON-SQ EPI CELLS URNS QL MICRO: ABNORMAL /HPF
NRBC BLD AUTO-RTO: 0 /100 WBCS
P AXIS: 30 DEGREES
PH UR STRIP.AUTO: 6.5 [PH] (ref 4.5–8)
PLATELET # BLD AUTO: 382 THOUSANDS/UL (ref 149–390)
PMV BLD AUTO: 9.7 FL (ref 8.9–12.7)
POTASSIUM SERPL-SCNC: 4 MMOL/L (ref 3.5–5.3)
PR INTERVAL: 134 MS
PROT SERPL-MCNC: 7.5 G/DL (ref 6.4–8.4)
PROT UR STRIP-MCNC: NEGATIVE MG/DL
QRS AXIS: 63 DEGREES
QRSD INTERVAL: 74 MS
QT INTERVAL: 368 MS
QTC INTERVAL: 402 MS
RBC # BLD AUTO: 5.14 MILLION/UL (ref 3.81–5.12)
RBC #/AREA URNS AUTO: ABNORMAL /HPF
SODIUM SERPL-SCNC: 133 MMOL/L (ref 135–147)
SP GR UR STRIP.AUTO: >=1.03 (ref 1–1.03)
T WAVE AXIS: 42 DEGREES
UROBILINOGEN UR QL STRIP.AUTO: 0.2 E.U./DL
VENTRICULAR RATE: 72 BPM
WBC # BLD AUTO: 10.19 THOUSAND/UL (ref 4.31–10.16)
WBC #/AREA URNS AUTO: ABNORMAL /HPF

## 2023-04-28 RX ORDER — HEPARIN SODIUM 5000 [USP'U]/ML
5000 INJECTION, SOLUTION INTRAVENOUS; SUBCUTANEOUS EVERY 8 HOURS SCHEDULED
Status: DISCONTINUED | OUTPATIENT
Start: 2023-04-28 | End: 2023-04-29 | Stop reason: HOSPADM

## 2023-04-28 RX ORDER — ONDANSETRON 2 MG/ML
4 INJECTION INTRAMUSCULAR; INTRAVENOUS ONCE AS NEEDED
Status: CANCELLED | OUTPATIENT
Start: 2023-04-28

## 2023-04-28 RX ORDER — LABETALOL HYDROCHLORIDE 5 MG/ML
INJECTION, SOLUTION INTRAVENOUS AS NEEDED
Status: DISCONTINUED | OUTPATIENT
Start: 2023-04-28 | End: 2023-04-28

## 2023-04-28 RX ORDER — KETOROLAC TROMETHAMINE 30 MG/ML
15 INJECTION, SOLUTION INTRAMUSCULAR; INTRAVENOUS ONCE
Status: COMPLETED | OUTPATIENT
Start: 2023-04-28 | End: 2023-04-28

## 2023-04-28 RX ORDER — MAGNESIUM HYDROXIDE 1200 MG/15ML
LIQUID ORAL AS NEEDED
Status: DISCONTINUED | OUTPATIENT
Start: 2023-04-28 | End: 2023-04-28 | Stop reason: HOSPADM

## 2023-04-28 RX ORDER — HYDROMORPHONE HCL/PF 1 MG/ML
0.4 SYRINGE (ML) INJECTION
Status: CANCELLED | OUTPATIENT
Start: 2023-04-28

## 2023-04-28 RX ORDER — FENTANYL CITRATE/PF 50 MCG/ML
25 SYRINGE (ML) INJECTION
Status: DISCONTINUED | OUTPATIENT
Start: 2023-04-28 | End: 2023-04-28 | Stop reason: HOSPADM

## 2023-04-28 RX ORDER — ONDANSETRON 2 MG/ML
4 INJECTION INTRAMUSCULAR; INTRAVENOUS ONCE
Status: COMPLETED | OUTPATIENT
Start: 2023-04-28 | End: 2023-04-28

## 2023-04-28 RX ORDER — HYDRALAZINE HYDROCHLORIDE 20 MG/ML
10 INJECTION INTRAMUSCULAR; INTRAVENOUS ONCE AS NEEDED
Status: CANCELLED | OUTPATIENT
Start: 2023-04-28

## 2023-04-28 RX ORDER — HYDROMORPHONE HCL/PF 1 MG/ML
0.5 SYRINGE (ML) INJECTION EVERY 4 HOURS PRN
Status: DISCONTINUED | OUTPATIENT
Start: 2023-04-28 | End: 2023-04-28

## 2023-04-28 RX ORDER — HYDROMORPHONE HCL/PF 1 MG/ML
1 SYRINGE (ML) INJECTION EVERY 4 HOURS PRN
Status: DISCONTINUED | OUTPATIENT
Start: 2023-04-28 | End: 2023-04-28

## 2023-04-28 RX ORDER — METRONIDAZOLE 500 MG/100ML
500 INJECTION, SOLUTION INTRAVENOUS EVERY 8 HOURS
Status: COMPLETED | OUTPATIENT
Start: 2023-04-28 | End: 2023-04-29

## 2023-04-28 RX ORDER — HYDROMORPHONE HCL IN WATER/PF 6 MG/30 ML
0.2 PATIENT CONTROLLED ANALGESIA SYRINGE INTRAVENOUS
Status: DISCONTINUED | OUTPATIENT
Start: 2023-04-28 | End: 2023-04-29 | Stop reason: HOSPADM

## 2023-04-28 RX ORDER — BUPIVACAINE HYDROCHLORIDE 5 MG/ML
INJECTION, SOLUTION PERINEURAL AS NEEDED
Status: DISCONTINUED | OUTPATIENT
Start: 2023-04-28 | End: 2023-04-28 | Stop reason: HOSPADM

## 2023-04-28 RX ORDER — ONDANSETRON 2 MG/ML
INJECTION INTRAMUSCULAR; INTRAVENOUS AS NEEDED
Status: DISCONTINUED | OUTPATIENT
Start: 2023-04-28 | End: 2023-04-28

## 2023-04-28 RX ORDER — SODIUM CHLORIDE, SODIUM GLUCONATE, SODIUM ACETATE, POTASSIUM CHLORIDE, MAGNESIUM CHLORIDE, SODIUM PHOSPHATE, DIBASIC, AND POTASSIUM PHOSPHATE .53; .5; .37; .037; .03; .012; .00082 G/100ML; G/100ML; G/100ML; G/100ML; G/100ML; G/100ML; G/100ML
125 INJECTION, SOLUTION INTRAVENOUS CONTINUOUS
Status: DISCONTINUED | OUTPATIENT
Start: 2023-04-28 | End: 2023-04-28

## 2023-04-28 RX ORDER — HYDROMORPHONE HYDROCHLORIDE 2 MG/1
2 TABLET ORAL EVERY 4 HOURS PRN
Status: DISCONTINUED | OUTPATIENT
Start: 2023-04-28 | End: 2023-04-29 | Stop reason: HOSPADM

## 2023-04-28 RX ORDER — HYDROMORPHONE HYDROCHLORIDE 2 MG/1
1 TABLET ORAL EVERY 4 HOURS PRN
Status: DISCONTINUED | OUTPATIENT
Start: 2023-04-28 | End: 2023-04-29 | Stop reason: HOSPADM

## 2023-04-28 RX ORDER — CEFAZOLIN SODIUM 2 G/50ML
2000 SOLUTION INTRAVENOUS EVERY 8 HOURS
Status: COMPLETED | OUTPATIENT
Start: 2023-04-28 | End: 2023-04-29

## 2023-04-28 RX ORDER — HYDROMORPHONE HCL/PF 1 MG/ML
0.5 SYRINGE (ML) INJECTION
Status: DISCONTINUED | OUTPATIENT
Start: 2023-04-28 | End: 2023-04-28 | Stop reason: HOSPADM

## 2023-04-28 RX ORDER — FENTANYL CITRATE 50 UG/ML
50 INJECTION, SOLUTION INTRAMUSCULAR; INTRAVENOUS ONCE
Status: COMPLETED | OUTPATIENT
Start: 2023-04-28 | End: 2023-04-28

## 2023-04-28 RX ORDER — NALOXONE HYDROCHLORIDE 0.4 MG/ML
0.1 INJECTION, SOLUTION INTRAMUSCULAR; INTRAVENOUS; SUBCUTANEOUS
Status: DISCONTINUED | OUTPATIENT
Start: 2023-04-28 | End: 2023-04-29 | Stop reason: HOSPADM

## 2023-04-28 RX ORDER — MIDAZOLAM HYDROCHLORIDE 2 MG/2ML
INJECTION, SOLUTION INTRAMUSCULAR; INTRAVENOUS AS NEEDED
Status: DISCONTINUED | OUTPATIENT
Start: 2023-04-28 | End: 2023-04-28

## 2023-04-28 RX ORDER — PROPOFOL 10 MG/ML
INJECTION, EMULSION INTRAVENOUS AS NEEDED
Status: DISCONTINUED | OUTPATIENT
Start: 2023-04-28 | End: 2023-04-28

## 2023-04-28 RX ORDER — FENTANYL CITRATE 50 UG/ML
INJECTION, SOLUTION INTRAMUSCULAR; INTRAVENOUS AS NEEDED
Status: DISCONTINUED | OUTPATIENT
Start: 2023-04-28 | End: 2023-04-28

## 2023-04-28 RX ORDER — HYDROMORPHONE HYDROCHLORIDE 2 MG/1
2 TABLET ORAL EVERY 4 HOURS PRN
Status: DISCONTINUED | OUTPATIENT
Start: 2023-04-28 | End: 2023-04-28

## 2023-04-28 RX ORDER — METRONIDAZOLE 500 MG/100ML
500 INJECTION, SOLUTION INTRAVENOUS EVERY 8 HOURS
Status: DISCONTINUED | OUTPATIENT
Start: 2023-04-28 | End: 2023-04-28

## 2023-04-28 RX ORDER — HYDROMORPHONE HCL/PF 1 MG/ML
SYRINGE (ML) INJECTION AS NEEDED
Status: DISCONTINUED | OUTPATIENT
Start: 2023-04-28 | End: 2023-04-28

## 2023-04-28 RX ORDER — OXYCODONE HYDROCHLORIDE 5 MG/1
5 TABLET ORAL EVERY 4 HOURS PRN
Status: DISCONTINUED | OUTPATIENT
Start: 2023-04-28 | End: 2023-04-28

## 2023-04-28 RX ORDER — ALBUTEROL SULFATE 2.5 MG/3ML
2.5 SOLUTION RESPIRATORY (INHALATION) ONCE AS NEEDED
Status: CANCELLED | OUTPATIENT
Start: 2023-04-28

## 2023-04-28 RX ORDER — SODIUM CHLORIDE, SODIUM GLUCONATE, SODIUM ACETATE, POTASSIUM CHLORIDE, MAGNESIUM CHLORIDE, SODIUM PHOSPHATE, DIBASIC, AND POTASSIUM PHOSPHATE .53; .5; .37; .037; .03; .012; .00082 G/100ML; G/100ML; G/100ML; G/100ML; G/100ML; G/100ML; G/100ML
100 INJECTION, SOLUTION INTRAVENOUS CONTINUOUS
Status: DISCONTINUED | OUTPATIENT
Start: 2023-04-28 | End: 2023-04-29

## 2023-04-28 RX ORDER — CEFAZOLIN SODIUM 2 G/50ML
2000 SOLUTION INTRAVENOUS EVERY 8 HOURS
Status: DISCONTINUED | OUTPATIENT
Start: 2023-04-28 | End: 2023-04-28

## 2023-04-28 RX ORDER — METOCLOPRAMIDE HYDROCHLORIDE 5 MG/ML
10 INJECTION INTRAMUSCULAR; INTRAVENOUS ONCE AS NEEDED
Status: CANCELLED | OUTPATIENT
Start: 2023-04-28

## 2023-04-28 RX ORDER — ROCURONIUM BROMIDE 10 MG/ML
INJECTION, SOLUTION INTRAVENOUS AS NEEDED
Status: DISCONTINUED | OUTPATIENT
Start: 2023-04-28 | End: 2023-04-28

## 2023-04-28 RX ORDER — LIDOCAINE HYDROCHLORIDE 20 MG/ML
INJECTION, SOLUTION EPIDURAL; INFILTRATION; INTRACAUDAL; PERINEURAL AS NEEDED
Status: DISCONTINUED | OUTPATIENT
Start: 2023-04-28 | End: 2023-04-28

## 2023-04-28 RX ORDER — DEXAMETHASONE SODIUM PHOSPHATE 10 MG/ML
INJECTION, SOLUTION INTRAMUSCULAR; INTRAVENOUS AS NEEDED
Status: DISCONTINUED | OUTPATIENT
Start: 2023-04-28 | End: 2023-04-28

## 2023-04-28 RX ORDER — ONDANSETRON 2 MG/ML
4 INJECTION INTRAMUSCULAR; INTRAVENOUS EVERY 6 HOURS PRN
Status: DISCONTINUED | OUTPATIENT
Start: 2023-04-28 | End: 2023-04-29

## 2023-04-28 RX ADMIN — KETOROLAC TROMETHAMINE 15 MG: 30 INJECTION, SOLUTION INTRAMUSCULAR; INTRAVENOUS at 03:12

## 2023-04-28 RX ADMIN — FENTANYL CITRATE 50 MCG: 50 INJECTION INTRAMUSCULAR; INTRAVENOUS at 03:12

## 2023-04-28 RX ADMIN — FENTANYL CITRATE 50 MCG: 50 INJECTION INTRAMUSCULAR; INTRAVENOUS at 18:18

## 2023-04-28 RX ADMIN — METRONIDAZOLE 500 MG: 500 INJECTION, SOLUTION INTRAVENOUS at 07:54

## 2023-04-28 RX ADMIN — HEPARIN SODIUM 5000 UNITS: 5000 INJECTION INTRAVENOUS; SUBCUTANEOUS at 07:56

## 2023-04-28 RX ADMIN — METRONIDAZOLE 500 MG: 500 INJECTION, SOLUTION INTRAVENOUS at 14:57

## 2023-04-28 RX ADMIN — ROCURONIUM BROMIDE 40 MG: 10 INJECTION, SOLUTION INTRAVENOUS at 18:01

## 2023-04-28 RX ADMIN — HEPARIN SODIUM 5000 UNITS: 5000 INJECTION INTRAVENOUS; SUBCUTANEOUS at 22:11

## 2023-04-28 RX ADMIN — HYDROMORPHONE HYDROCHLORIDE 0.5 MG: 1 INJECTION, SOLUTION INTRAMUSCULAR; INTRAVENOUS; SUBCUTANEOUS at 18:41

## 2023-04-28 RX ADMIN — SODIUM CHLORIDE 1000 ML: 0.9 INJECTION, SOLUTION INTRAVENOUS at 03:11

## 2023-04-28 RX ADMIN — CEFAZOLIN SODIUM 2000 MG: 2 SOLUTION INTRAVENOUS at 06:45

## 2023-04-28 RX ADMIN — SUGAMMADEX 200 MG: 100 INJECTION, SOLUTION INTRAVENOUS at 19:29

## 2023-04-28 RX ADMIN — FENTANYL CITRATE 50 MCG: 50 INJECTION INTRAMUSCULAR; INTRAVENOUS at 18:00

## 2023-04-28 RX ADMIN — CEFAZOLIN SODIUM 2000 MG: 2 SOLUTION INTRAVENOUS at 22:05

## 2023-04-28 RX ADMIN — SODIUM CHLORIDE, SODIUM GLUCONATE, SODIUM ACETATE, POTASSIUM CHLORIDE, MAGNESIUM CHLORIDE, SODIUM PHOSPHATE, DIBASIC, AND POTASSIUM PHOSPHATE 125 ML/HR: .53; .5; .37; .037; .03; .012; .00082 INJECTION, SOLUTION INTRAVENOUS at 07:45

## 2023-04-28 RX ADMIN — SODIUM CHLORIDE, SODIUM GLUCONATE, SODIUM ACETATE, POTASSIUM CHLORIDE, MAGNESIUM CHLORIDE, SODIUM PHOSPHATE, DIBASIC, AND POTASSIUM PHOSPHATE 125 ML/HR: .53; .5; .37; .037; .03; .012; .00082 INJECTION, SOLUTION INTRAVENOUS at 20:00

## 2023-04-28 RX ADMIN — ONDANSETRON 4 MG: 2 INJECTION INTRAMUSCULAR; INTRAVENOUS at 21:33

## 2023-04-28 RX ADMIN — PROPOFOL 50 MG: 10 INJECTION, EMULSION INTRAVENOUS at 19:30

## 2023-04-28 RX ADMIN — MIDAZOLAM 1 MG: 1 INJECTION INTRAMUSCULAR; INTRAVENOUS at 17:53

## 2023-04-28 RX ADMIN — LIDOCAINE HYDROCHLORIDE 100 MG: 20 INJECTION, SOLUTION EPIDURAL; INFILTRATION; INTRACAUDAL; PERINEURAL at 18:00

## 2023-04-28 RX ADMIN — IOHEXOL 100 ML: 350 INJECTION, SOLUTION INTRAVENOUS at 04:23

## 2023-04-28 RX ADMIN — DEXAMETHASONE SODIUM PHOSPHATE 10 MG: 10 INJECTION, SOLUTION INTRAMUSCULAR; INTRAVENOUS at 18:13

## 2023-04-28 RX ADMIN — PROPOFOL 150 MG: 10 INJECTION, EMULSION INTRAVENOUS at 18:00

## 2023-04-28 RX ADMIN — SODIUM CHLORIDE, SODIUM GLUCONATE, SODIUM ACETATE, POTASSIUM CHLORIDE, MAGNESIUM CHLORIDE, SODIUM PHOSPHATE, DIBASIC, AND POTASSIUM PHOSPHATE 125 ML/HR: .53; .5; .37; .037; .03; .012; .00082 INJECTION, SOLUTION INTRAVENOUS at 14:59

## 2023-04-28 RX ADMIN — METRONIDAZOLE 500 MG: 500 INJECTION, SOLUTION INTRAVENOUS at 22:45

## 2023-04-28 RX ADMIN — CEFAZOLIN SODIUM 2000 MG: 2 SOLUTION INTRAVENOUS at 16:57

## 2023-04-28 RX ADMIN — ONDANSETRON 4 MG: 2 INJECTION INTRAMUSCULAR; INTRAVENOUS at 19:18

## 2023-04-28 RX ADMIN — HYDROMORPHONE HYDROCHLORIDE 1 MG: 2 TABLET ORAL at 22:09

## 2023-04-28 RX ADMIN — HYDROMORPHONE HYDROCHLORIDE 0.5 MG: 1 INJECTION, SOLUTION INTRAMUSCULAR; INTRAVENOUS; SUBCUTANEOUS at 19:10

## 2023-04-28 RX ADMIN — ONDANSETRON 4 MG: 2 INJECTION INTRAMUSCULAR; INTRAVENOUS at 03:12

## 2023-04-28 RX ADMIN — LABETALOL HYDROCHLORIDE 5 MG: 5 INJECTION, SOLUTION INTRAVENOUS at 19:47

## 2023-04-28 NOTE — ED ATTENDING ATTESTATION
4/28/2023  I, Cammy Montiel DO, saw and evaluated the patient  I have discussed the patient with the resident/non-physician practitioner and agree with the resident's/non-physician practitioner's findings, Plan of Care, and MDM as documented in the resident's/non-physician practitioner's note, except where noted  All available labs and Radiology studies were reviewed  I was present for key portions of any procedure(s) performed by the resident/non-physician practitioner and I was immediately available to provide assistance  At this point I agree with the current assessment done in the Emergency Department  I have conducted an independent evaluation of this patient a history and physical is as follows:    61-year-old female presents for epigastric pain  Onset was 6 hours prior to arrival   Right upper quadrant radiates to the back  Nausea without vomiting  No fevers no chills  Some loose stools  On exam she is very tender right upper quadrant  Positive Villegas sign    Assessment plan: Right upper quadrant pain differential includes cholecystitis, pancreatitis, gastritis, colitis gastroenteritis    Plan CT abdomen pelvis labs bedside ultrasound pain control    ED Course         Critical Care Time  POC Biliary US    Date/Time: 4/28/2023 6:16 AM  Performed by: Cammy Montiel DO  Authorized by: Cammy Montiel DO     Patient location:  ED  Performed by:   Attending  Procedure details:     Exam Type:  Diagnostic    Indications: upper right quadrant abdominal pain and epigastric pain      Assessment for:  Cholecystitis and cholelithiasis    Views obtained: gallbladder (transverse and longitudinal) and liver      Image quality: diagnostic      Image availability:  Images available in PACS  Findings:     Cholelithiasis: identified      Common bile duct:  Unable to visualize    Gallbladder wall:  Abnormal    Gallbladder wall thickened (>3 mm): yes      Gallbladder wall thickness (mm):  9 Pericholecystic fluid: identified      Sonographic Villegas's sign: positive      Polyps: not identified      Mass: not identified    Interpretation:     Biliary ultrasound impressions: cholecystitis

## 2023-04-28 NOTE — ANESTHESIA PREPROCEDURE EVALUATION
Procedure:  CHOLECYSTECTOMY LAPAROSCOPIC (Abdomen)    Relevant Problems   GI/HEPATIC   (+) Gastro-esophageal reflux disease without esophagitis      MUSCULOSKELETAL   (+) Arthritis   (+) Backache      NEURO/PSYCH   (+) Depression   (+) Headache        Physical Exam    Airway    Mallampati score: III  TM Distance: >3 FB  Neck ROM: full     Dental   upper dentures and lower dentures,     Cardiovascular      Pulmonary      Other Findings        Anesthesia Plan  ASA Score- 3     Anesthesia Type- general with ASA Monitors  Additional Monitors:   Airway Plan: ETT  Plan Factors-    Chart reviewed  Patient summary reviewed  Patient is not a current smoker  Patient did not smoke on day of surgery  Induction- intravenous  Postoperative Plan-   Planned trial extubation    Informed Consent- Anesthetic plan and risks discussed with patient  I personally reviewed this patient with the CRNA  Discussed and agreed on the Anesthesia Plan with the CRNA  Todd Benedict

## 2023-04-28 NOTE — ED PROVIDER NOTES
"History  Chief Complaint   Patient presents with    Epigastric Pain     Pt c/o epigastric pain radiating to back  SOB, Weakness     70-year-old female with past medical history significant for GERD, kidney stones and  section is now presenting with acute onset epigastric abdominal pain that radiates through to her back  Patient reports that pain began approximately 4 to 6 hours prior to her arrival here in the emergency department  She describes it as a sharp, aching sensation in her epigastrium that is now a 9 out of 10 in severity with radiation straight through to her thoracic back  She is endorsing some mild nausea, denying any vomiting  She notes that for the past several days she has had diarrhea that she describes as watery, nonbloody, nonbilious  She is denying any chest pain or shortness of breath  She denies any headache, or neurologic symptoms such as weakness, numbness, tingling  Please note, and spite of triage note above, patient is denying any shortness of breath to me even when specifically asked  Prior to Admission Medications   Prescriptions Last Dose Informant Patient Reported?  Taking?   amoxicillin (AMOXIL) 500 mg capsule Not Taking  Yes No   Sig: Take 500 mg by mouth every 8 (eight) hours Given by outside dentist   Patient not taking: Reported on 2023   chlorhexidine (PERIDEX) 0 12 % solution   No No   Sig: Apply 15 mL to the mouth or throat 2 (two) times a day   Patient taking differently: Apply 15 mL to the mouth or throat 2 (two) times a day Using \"little bit\"   diazepam (VALIUM) 10 mg tablet Past Month  Yes Yes   Sig: Take 5 mg by mouth every 8 (eight) hours as needed for anxiety   diclofenac sodium (VOLTAREN) 50 mg EC tablet Not Taking  Yes No   Sig: Take 50 mg by mouth 2 (two) times a day   Patient not taking: Reported on 2022   ibuprofen (MOTRIN) 400 mg tablet Not Taking  No No   Sig: Take 1 tablet (400 mg total) by mouth every 6 (six) hours as needed for " mild pain or moderate pain   Patient not taking: Reported on 2022   ibuprofen (MOTRIN) 600 mg tablet Not Taking  Yes No   Sig: Take 600 mg by mouth every 6 (six) hours as needed for mild pain Given by outside dentist   Patient not taking: Reported on 2023   meloxicam (MOBIC) 7 5 mg tablet Past Month  Yes Yes   Sig: TAKE 1 TABLET BY ORAL ROUTE ONCE DAILY WITH FOOD AS NEEDED FOR PAIN   naproxen (NAPROSYN) 500 mg tablet   No No   Sig: Take 1 tablet by mouth 2 (two) times a day with meals for 15 days   Patient taking differently: Take 500 mg by mouth as needed     tamsulosin (FLOMAX) 0 4 mg   Yes No   Sig: Take 1 capsule by mouth Daily   Patient not taking: Reported on 2022      Facility-Administered Medications: None       Past Medical History:   Diagnosis Date    Arthritis     GERD (gastroesophageal reflux disease)     Kidney stone     Meningioma [facility])     Patient states she has had no traetment for this       Past Surgical History:   Procedure Laterality Date    ABDOMINAL SURGERY      ANTERIOR CRUCIATE LIGAMENT REPAIR Right     CARPAL TUNNEL RELEASE Bilateral      SECTION      x2    ESOPHAGOGASTRODUODENOSCOPY      KIDNEY STONE SURGERY      NE COLONOSCOPY FLX DX W/COLLJ [address] WHEN PFRMD N/A 2018    Procedure: COLONOSCOPY;  Surgeon: Teresa Flannery MD;  [facility]; Service: Colorectal    ROTATOR CUFF REPAIR Right        History reviewed  No pertinent family history  I have reviewed and agree with the history as documented  E-Cigarette/Vaping    E-Cigarette Use Never User      E-Cigarette/Vaping Substances     Social History     Tobacco Use    Smoking status: Former     Types: Cigarettes     Quit date:      Years since quitting: 15 3    Smokeless tobacco: Never   Vaping Use    Vaping Use: Never used   Substance Use Topics    Alcohol use: No    Drug use: No        Review of Systems   Constitutional: Positive for chills  Negative for fever     HENT: Negative for ear pain and sore throat  Eyes: Negative for pain and visual disturbance  Respiratory: Negative for cough and shortness of breath  Cardiovascular: Negative for chest pain and palpitations  Gastrointestinal: Positive for abdominal pain, nausea and vomiting  Genitourinary: Negative for dysuria and hematuria  Musculoskeletal: Negative for arthralgias and back pain  Skin: Negative for color change and rash  Neurological: Negative for seizures and syncope  All other systems reviewed and are negative  Physical Exam  ED Triage Vitals   Temperature Pulse Respirations Blood Pressure SpO2   04/28/23 0239 04/28/23 0239 04/28/23 0239 04/28/23 0239 04/28/23 0239   97 5 °F (36 4 °C) 75 18 (!) 199/81 99 %      Temp Source Heart Rate Source Patient Position - Orthostatic VS BP Location FiO2 (%)   04/28/23 0239 04/28/23 0239 04/28/23 0239 04/28/23 0239 --   Oral Monitor Lying Left arm       Pain Score       04/28/23 0351       No Pain             Orthostatic Vital Signs  Vitals:    04/28/23 1948 04/28/23 2000 04/28/23 2006 04/28/23 2015   BP: 149/75 155/75 155/75 149/77   Pulse: 78 72 72 70   Patient Position - Orthostatic VS:           Physical Exam  Vitals and nursing note reviewed  Constitutional:       General: She is not in acute distress  Appearance: She is well-developed  She is obese  She is not ill-appearing  HENT:      Head: Normocephalic and atraumatic  Right Ear: External ear normal       Left Ear: External ear normal       Nose: Nose normal  No congestion or rhinorrhea  Mouth/Throat:      Mouth: Mucous membranes are moist       Pharynx: Oropharynx is clear  No oropharyngeal exudate or posterior oropharyngeal erythema  Eyes:      General: No scleral icterus  Extraocular Movements: Extraocular movements intact  Conjunctiva/sclera: Conjunctivae normal       Pupils: Pupils are equal, round, and reactive to light  Cardiovascular:      Rate and Rhythm: Regular rhythm  Pulses: Normal pulses  Heart sounds: Normal heart sounds  No murmur heard  Pulmonary:      Effort: Pulmonary effort is normal  No respiratory distress  Breath sounds: Normal breath sounds  No wheezing or rhonchi  Abdominal:      General: Abdomen is flat  There is no distension  Tenderness: There is abdominal tenderness  There is guarding  There is no right CVA tenderness or left CVA tenderness  Musculoskeletal:         General: No swelling  Cervical back: Neck supple  No rigidity  Right lower leg: No edema  Left lower leg: No edema  Lymphadenopathy:      Cervical: No cervical adenopathy  Skin:     General: Skin is warm and dry  Capillary Refill: Capillary refill takes less than 2 seconds  Coloration: Skin is not jaundiced  Findings: No rash  Neurological:      General: No focal deficit present  Mental Status: She is alert and oriented to person, place, and time  Mental status is at baseline     Psychiatric:         Mood and Affect: Mood normal          Behavior: Behavior normal          ED Medications  Medications   heparin (porcine) subcutaneous injection 5,000 Units (5,000 Units Subcutaneous Given 4/29/23 0509)   ondansetron (ZOFRAN) injection 4 mg (4 mg Intravenous Given 4/29/23 0522)   metroNIDAZOLE (FLAGYL) IVPB (premix) 500 mg 100 mL (500 mg Intravenous New Bag 4/29/23 0549)   naloxone [facility]) injection 0 1 mg (has no administration in time range)   multi-electrolyte (PLASMALYTE-A/ISOLYTE-S PH 7 4) IV solution (100 mL/hr Intravenous Rate/Dose Change 4/28/23 2154)   HYDROmorphone (DILAUDID) tablet 2 mg (2 mg Oral Given 4/29/23 0522)   HYDROmorphone (DILAUDID) tablet 1 mg (1 mg Oral Given 4/28/23 2209)   HYDROmorphone HCl (DILAUDID) injection 0 2 mg (0 2 mg Intravenous Given 4/29/23 0151)   sodium chloride 0 9 % bolus 1,000 mL (0 mL Intravenous Stopped 4/28/23 0427)   ketorolac (TORADOL) injection 15 mg (15 mg Intravenous Given 4/28/23 0312) ondansetron [facility]) injection 4 mg (4 mg Intravenous Given 4/28/23 0312)   fentanyl citrate (PF) 100 MCG/2ML 50 mcg (50 mcg Intravenous Given 4/28/23 0312)   iohexol (OMNIPAQUE) 350 MG/ML injection (SINGLE-DOSE) 100 mL (100 mL Intravenous Given 4/28/23 0423)   ceFAZolin (ANCEF) IVPB (premix in dextrose) 2,000 mg 50 mL (0 mg Intravenous Stopped 4/29/23 0556)       Diagnostic Studies  Results Reviewed     Procedure Component Value Units Date/Time    HS Troponin I 4hr [[id number]]  (Normal) Collected: 04/28/23 0649    Lab Status: Final result Specimen: Blood from Arm, Left Updated: 04/28/23 0741     hs TnI 4hr 4 ng/L      Delta 4hr hsTnI >2 ng/L     HS Troponin I 2hr [[id number]]  (Normal) Collected: 04/28/23 0502    Lab Status: Final result Specimen: Blood from Arm, Right Updated: 04/28/23 0602     hs TnI 2hr 4 ng/L      Delta 2hr hsTnI >2 ng/L     Urine Microscopic [[id number]]  (Abnormal) Collected: 04/28/23 0422    Lab Status: Final result Specimen: Urine, Clean Catch Updated: 04/28/23 0445     RBC, UA 10-20 /hpf      WBC, UA 1-2 /hpf      Epithelial Cells Occasional /hpf      Bacteria, UA None Seen /hpf      MUCUS THREADS Occasional    Urine Macroscopic, POC [[id number]]  (Abnormal) Collected: 04/28/23 0422    Lab Status: Final result Specimen: Urine Updated: 04/28/23 0423     Color, UA Yellow     Clarity, UA Clear     pH, UA 6 5     Leukocytes, UA Negative     Nitrite, UA Negative     Protein, UA Negative mg/dl      Glucose,  (1/2%) mg/dl      Ketones, UA Negative mg/dl      Urobilinogen, UA 0 2 E U /dl      Bilirubin, UA Negative     Occult Blood, UA Small     Specific [place], UA >=1 030    Narrative:      CLINITEK RESULT    HS Troponin 0hr (reflex protocol) [[id number]]  (Normal) Collected: 04/28/23 0309    Lab Status: Final result Specimen: Blood from Arm, Right Updated: 04/28/23 0401     hs TnI 0hr <2 ng/L     Comprehensive metabolic panel [[id number]]  (Abnormal) Collected: 04/28/23 0309    Lab Status: Final result Specimen: Blood from Arm, Right Updated: 04/28/23 0354     Sodium 133 mmol/L      Potassium 4 0 mmol/L      Chloride 102 mmol/L      CO2 28 mmol/L      ANION GAP 3 mmol/L      BUN 14 mg/dL      Creatinine 0 80 mg/dL      Glucose 290 mg/dL      Calcium 9 8 mg/dL      AST 22 U/L      ALT 34 U/L      Alkaline Phosphatase 132 U/L      Total Protein 7 5 g/dL      Albumin 3 5 g/dL      Total Bilirubin 0 25 mg/dL      eGFR 77 ml/min/1 73sq m     Narrative:      [place] guidelines for Chronic Kidney Disease (CKD):     Stage 1 with normal or high GFR (GFR > 90 mL/min/1 73 square meters)    Stage 2 Mild CKD (GFR = 60-89 mL/min/1 73 square meters)    Stage 3A Moderate CKD (GFR = 45-59 mL/min/1 73 square meters)    Stage 3B Moderate CKD (GFR = 30-44 mL/min/1 73 square meters)    Stage 4 Severe CKD (GFR = 15-29 mL/min/1 73 square meters)    Stage 5 End Stage CKD (GFR <15 mL/min/1 73 square meters)  Note: GFR calculation is accurate only with a steady state creatinine    Lipase [[id number]]  (Normal) Collected: 04/28/23 0309    Lab Status: Final result Specimen: Blood from Arm, Right Updated: 04/28/23 0354     Lipase 134 u/L     CBC and differential [[id number]]  (Abnormal) Collected: 04/28/23 0309    Lab Status: Final result Specimen: Blood from Arm, Right Updated: 04/28/23 0333     WBC 10 19 Thousand/uL      RBC 5 14 Million/uL      Hemoglobin 14 7 g/dL      Hematocrit 44 6 %      MCV 87 fL      MCH 28 6 pg      MCHC 33 0 g/dL      RDW 13 0 %      MPV 9 7 fL      Platelets 180 Thousands/uL      nRBC 0 /100 WBCs      Neutrophils Relative 69 %      Immat GRANS % 0 %      Lymphocytes Relative 22 %      Monocytes Relative 6 %      Eosinophils Relative 3 %      Basophils Relative 0 %      Neutrophils Absolute 7 08 Thousands/µL      Immature Grans Absolute 0 03 Thousand/uL      Lymphocytes Absolute 2 21 Thousands/µL      Monocytes Absolute 0 57 Thousand/µL      Eosinophils Absolute 0 26 Thousand/µL      Basophils Absolute 0 04 Thousands/µL                  CT abdomen pelvis with contrast   Final Result by Jeana Khan MD (04/28 0501)      1  Abnormal findings of the gallbladder as detailed above which could indicate early acute cholecystitis in the appropriate clinical setting  Consider right upper quadrant ultrasound for better evaluation  No significant biliary ductal dilatation noted  2   Contracted urinary bladder limiting evaluation without intraluminal mass  Mild diffuse bladder wall thickening could be due to under distention or cystitis, recommend correlation with urinalysis  3   No evidence of bowel obstruction, inflammation, appendicitis, obstructive uropathy, free air, or free fluid  Procedures  Procedures      ED Course  ED Course as of 04/29/23 0613   Fri Apr 28, 2023   0601 CT abdomen pelvis with contrast  Surgery contacted, coming to evaluate  Based on scan, patient appears to have acute cholecystitis  Patient's exam is markedly improved at this time however  Identification of Seniors at [address] [place] [address] Most Recent Value   (ISAR) Identification of Seniors at Risk    Before the illness or injury that brought you to the Emergency, did you need someone to help you on a regular basis? 0 Filed at: 04/28/2023 0242   In the last 24 hours, have you needed more help than usual? 0 Filed at: 04/28/2023 9121   Have you been hospitalized for one or more nights during the past 6 months? 0 Filed at: 04/28/2023 0242   In general, do you see well? 0 Filed at: 04/28/2023 0242   In general, do you have serious problems with your memory? 0 Filed at: 04/28/2023 8777   Do you take more than three different medications every day? 1 Filed at: 04/28/2023 0242   ISAR Score 1 Filed at: 04/28/2023 0242                    SBIRT 20yo+    Flowsheet Row Most Recent Value   Initial Alcohol Screen: US AUDIT-C     1   How often do you have a drink containing alcohol? 0 Filed at: 04/28/2023 0242   2  How many drinks containing alcohol do you have on a typical day you are drinking? 0 Filed at: 04/28/2023 0242   3b  FEMALE Any Age, or MALE 65+: How often do you have 4 or more drinks on one occassion? 0 Filed at: 04/28/2023 0242   Audit-C Score 0 Filed at: 04/28/2023 0094   ADAN: How many times in the past year have you    Used an illegal drug or used a prescription medication for non-medical reasons? Never Filed at: 04/28/2023 0242                Medical Decision Making  62-year-old female presenting with abdominal pain in the epigastric region  Will evaluate abdominal pain for signs of acute cholecystitis, pancreatitis, other hepatitis, or nephritis with metabolic panel and lipase  Will evaluate for possible atypical chest pain/ACS with troponin and ECG  Will image with CT of the abdomen and pelvis  We will treat pain and nausea as needed  IV access, IV fluids  Reassessment/disposition: Patient symptoms improved after second dose of IV analgesia, however on patient's imaging there is significant concern for acute cholecystitis  Surgery team contacted  Will admit patient for observation with low threshold to take to the OR for cholecystectomy  Amount and/or Complexity of Data Reviewed  Labs: ordered  Radiology: ordered  Decision-making details documented in ED Course  Risk  Prescription drug management  Decision regarding hospitalization              Disposition  Final diagnoses:   Acute epigastric pain   Cholelithiasis   Biliary colic     Time reflects when diagnosis was documented in both MDM as applicable and the Disposition within this note     Time User Action Codes Description Comment    4/28/2023  6:31 AM Pascual Legato Add [R10 13] Acute epigastric pain     4/28/2023  6:31 AM Pascual Legato Add [K80 20] Cholelithiasis     4/28/2023  6:32 AM Pascual Legato Add [P68 38] Biliary colic     3/04/3831  3:98 AM Kirti Ross Add [K80 00] Acute calculous cholecystitis     4/28/2023  7:07 PM Rosita Gomez Landry [K80 00] Acute calculous cholecystitis       ED Disposition     ED Disposition   Admit    Condition   Stable    Date/Time   Fri Apr 28, 2023  6:32 AM    Comment   Case was discussed with Dr Arianna Carmona and the patient's admission status was agreed to be Admission Status: observation status to the service of Dr Arianna Carmona             Follow-up Information     Follow up With Specialties Details Why Contact Info Additional [facility], [address] [place] [address]  [phone]       [address] [place] [address] Emergency Department Emergency Medicine Go to  If symptoms worsen, As needed [address] [phone]  [facility] [address] [facility] Emergency Department, [address], [place], [place], [address] [facility] Trauma Surgery Call Please call to schedule a post op check up   [facility], [address], [facility], [place], [place], [address]          Current Discharge Medication List      CONTINUE these medications which have NOT CHANGED    Details   diazepam (VALIUM) 10 mg tablet Take 5 mg by mouth every 8 (eight) hours as needed for anxiety      meloxicam (MOBIC) 7 5 mg tablet TAKE 1 TABLET BY ORAL ROUTE ONCE DAILY WITH FOOD AS NEEDED FOR PAIN      amoxicillin (AMOXIL) 500 mg capsule Take 500 mg by mouth every 8 (eight) hours Given by outside dentist      chlorhexidine (PERIDEX) 0 12 % solution Apply 15 mL to the mouth or throat 2 (two) times a day  Qty: 120 mL, Refills: 0    Associated Diagnoses: Dentalgia      diclofenac sodium (VOLTAREN) 50 mg EC tablet Take 50 mg by mouth 2 (two) times a day      !! ibuprofen (MOTRIN) 400 mg tablet Take 1 tablet (400 mg total) by mouth every 6 (six) hours as needed for mild pain or moderate pain  Qty: 90 tablet, Refills: 0    Associated Diagnoses: Dentalgia      !! ibuprofen (MOTRIN) 600 mg tablet Take 600 mg by mouth every 6 (six) hours as needed for mild pain Given by outside dentist      naproxen (NAPROSYN) 500 mg tablet Take 1 tablet by mouth 2 (two) times a day with meals for 15 days  Qty: 30 tablet, Refills: 0      tamsulosin (FLOMAX) 0 4 mg Take 1 capsule by mouth Daily       ! ! - Potential duplicate medications found  Please discuss with provider  No discharge procedures on file  PDMP Review     None           ED Provider  Attending physically available and evaluated Sharon Erazo I managed the patient along with the ED Attending      Electronically Signed by         Paolo Orr MD  04/29/23 4953

## 2023-04-28 NOTE — DISCHARGE INSTRUCTIONS
Discharge instructions    1  General: You will feel pulling sensations around the wound and/or aches and pains around the incisions  This is normal  Even minor surgery is a change in your body and this is your bodys way of reaction to it  If you have had abdominal surgery, it may help to support the incision with a small pillow or blanket for comfort when moving or coughing  2  Wound care:    Glue - Leave glue alone, it will fall off on its own, no need for an additional dressings    3  Water: You may shower over the wound, unless there are drain tubes left in place  Do not bathe or use a pool or hot tub until cleared by the physician  You may shower right over the staples, glue or Steri-Strips and rinse wound with soapy water but do not scrub incision pat dry when you are done  4  Activity: You may go up and down stairs, walk as much as you are comfortable, but walk at least 3 times each day  If you have had abdominal or hernia surgery, do not lift anything heavier than 15 pounds for at least 4 weeks  5  Diet: You may resume a regular diet  If you had a same-day surgery or overnight stay surgery, you may wish to eat lightly for a few days: soups, crackers, and sandwiches  You may resume a regular diet when ready  6  Medications: Resume all of your previous medications, unless told otherwise by the doctor  Tylenol and ibuoprofen is always fine, unless you are taking any narcotic pain medication containing Tylenol (such as Percocet, Darvocet, Vicodin, or anything containing acetaminophen)  Do not take Tylenol if you're taking these medications  You do not need to take the narcotic pain medications unless you are having significant pain and discomfort  7  Driving: He will need someone to drive you home on the day of surgery  Do not drive or make any important decisions while on narcotic pain medication or 24 hours and after anesthesia or sedation for surgery   Generally, you may drive when your off all narcotic pain medications, and you can turn in your seat comfortably to check your blind spot  8  Upset Stomach: You may take Maalox, Tums, or similar items for an upset stomach  If your narcotic pain medication causes an upset stomach, do not take it on an empty stomach  Try taking it with at least some crackers or toast      9  Constipation: Patients often experienced constipation after surgery  You may take over-the-counter medication for this, such as Metamucil, Senokot, Dulcolax, milk of magnesia, etc  You may take a suppository unless you have had anorectal surgery such as a procedure on your hemorrhoids  If you experience significant nausea or vomiting after abdominal surgery, call the office before trying any of these medications  10  Call the office: If you are experiencing any of the following, fevers above 101 5°, significant nausea or vomiting, if the wound develops drainage and/or is excessive redness around the wound, or if you have significant diarrhea or other worsening symptoms  11  Pain: You may be given a prescription for pain  This will be given to the hospital, the day of surgery  12  Sexual Activity: You may resume sexual activity when you feel ready and comfortable and your incision is sealed and healed without apparent infection risk

## 2023-04-28 NOTE — H&P
H&P Exam - General Surgery   Dominick Canchola 79 y o  female MRN: [id number]  Unit/Bed#: 2 211-02 Encounter: [id number]    Assessment/Plan     Assessment:  Acute calculous cholecystitis    Plan:  · Admit to general surgery  · To OR for laparoscopic cholecystectomy  · IV ancef/flagyl  · NPO/IVF  · PRN analgesia  · DVT PPx    History of Present Illness     HPI:  Dominick Canchola is a 79 y o  female who presents with epigastric/RUQ abdominal pain  She had pain about a week ago, but it resolved after a few days  She again developed pain in the same region yesterday which was worse in intensity than the time before  She has associated nausea and a few episodes of diarrhea  Denies fevers/chills  PSHx notable for  x2 and laparoscopic tubal ligation  She denies anticoagulant use  Review of Systems   Gastrointestinal: Positive for abdominal pain, diarrhea and nausea  All other systems reviewed and are negative  Historical Information   Past Medical History:   Diagnosis Date    Arthritis     GERD (gastroesophageal reflux disease)     Kidney stone     Meningioma [facility])     Patient states she has had no traetment for this     Past Surgical History:   Procedure Laterality Date    ABDOMINAL SURGERY      ANTERIOR CRUCIATE LIGAMENT REPAIR Right     CARPAL TUNNEL RELEASE Bilateral      SECTION      x2    ESOPHAGOGASTRODUODENOSCOPY      KIDNEY STONE SURGERY      [place] COLONOSCOPY FLX DX W/COLLJ SPEC WHEN PFRMD N/A 2018    Procedure: COLONOSCOPY;  Surgeon: Lauren Harris MD;  Location: [facility];   Service: Colorectal    ROTATOR CUFF REPAIR Right      Social History   Social History     Substance and Sexual Activity   Alcohol Use No     Social History     Substance and Sexual Activity   Drug Use No     Social History     Tobacco Use   Smoking Status Former    Types: Cigarettes    Quit date:     Years since quitting: 15 3   Smokeless Tobacco Never     E-Cigarette/Vaping    E-Cigarette Use "Never User      E-Cigarette/Vaping Substances     Family History: History reviewed  No pertinent family history  Meds/Allergies   all medications and allergies reviewed  Allergies   Allergen Reactions    Nucynta [Tapentadol] Anaphylaxis    Acetaminophen Other (See Comments)    Hydrocodone-Acetaminophen Other (See Comments)    Meperidine Other (See Comments)    Percocet [Oxycodone-Acetaminophen] Rash       Objective   First Vitals:   Blood Pressure: (!) 199/81 (04/28/23 0239)  Pulse: 75 (04/28/23 0239)  Temperature: 97 5 °F (36 4 °C) (04/28/23 0239)  Temp Source: Oral (04/28/23 0239)  Respirations: 18 (04/28/23 0239)  Height: 4' 9\" (144 8 cm) (04/28/23 0732)  SpO2: 99 % (04/28/23 0239)    Current Vitals:   Blood Pressure: 143/75 (04/28/23 0732)  Pulse: 73 (04/28/23 0732)  Temperature: 98 1 °F (36 7 °C) (04/28/23 0732)  Temp Source: Oral (04/28/23 0732)  Respirations: 19 (04/28/23 0732)  Height: 4' 9\" (144 8 cm) (04/28/23 0732)  SpO2: 95 % (04/28/23 0732)    No intake or output data in the 24 hours ending 04/28/23 0800    Invasive Devices     Peripheral Intravenous Line  Duration           Peripheral IV 04/28/23 Distal;Right;Ventral (anterior) Forearm <1 day                Physical Exam   Gen:  NAD  HENT: MMM  CV:  RRR  Lungs: nl effort  Abd:  soft, non-distended, mild epigastric/RUQ tenderness, no rebound/guarding  Ext:  no CCE  Skin:  no rashes, well healed surgical scars  Neuro: A&Ox3     Lab Results:   I have personally reviewed pertinent lab results    , CBC:   Lab Results   Component Value Date    WBC 10 19 (H) 04/28/2023    HGB 14 7 04/28/2023    HCT 44 6 04/28/2023    MCV 87 04/28/2023     04/28/2023    MCH 28 6 04/28/2023    MCHC 33 0 04/28/2023    RDW 13 0 04/28/2023    MPV 9 7 04/28/2023    NRBC 0 04/28/2023   , CMP:   Lab Results   Component Value Date    SODIUM 133 (L) 04/28/2023    K 4 0 04/28/2023     04/28/2023    CO2 28 04/28/2023    BUN 14 04/28/2023    CREATININE 0 80 04/28/2023 " CALCIUM 9 8 04/28/2023    AST 22 04/28/2023    ALT 34 04/28/2023    ALKPHOS 132 (H) 04/28/2023    EGFR 77 04/28/2023   , Lipase:   Lab Results   Component Value Date    LIPASE 134 04/28/2023     Imaging: I have personally reviewed pertinent reports  and I have personally reviewed pertinent films in PACS  EKG, Pathology, and Other Studies: I have personally reviewed pertinent reports        Code Status: Level 1 - Full Code  Advance Directive and Living Will:      Power of :    POLST:

## 2023-04-28 NOTE — ANESTHESIA POSTPROCEDURE EVALUATION
Post-Op Assessment Note    CV Status:  Stable  Pain Score: 0    Pain management: adequate     Mental Status:  Sleepy and arousable   Hydration Status:  Stable   PONV Controlled:  None   Airway Patency:  Patent      Post Op Vitals Reviewed: Yes      Staff: CRNA         No notable events documented      /75 (04/28/23 1948)    Temp 99 8 °F (37 7 °C) (04/28/23 1948)    Pulse 78 (04/28/23 1948)   Resp 16 (04/28/23 1948)    SpO2 96 % (04/28/23 1948) 6L FM

## 2023-04-29 VITALS
TEMPERATURE: 98.4 F | HEART RATE: 81 BPM | DIASTOLIC BLOOD PRESSURE: 59 MMHG | RESPIRATION RATE: 19 BRPM | SYSTOLIC BLOOD PRESSURE: 119 MMHG | HEIGHT: 57 IN | OXYGEN SATURATION: 91 % | BODY MASS INDEX: 38.52 KG/M2

## 2023-04-29 LAB
ALBUMIN SERPL BCP-MCNC: 2.4 G/DL (ref 3.5–5)
ALP SERPL-CCNC: 77 U/L (ref 46–116)
ALT SERPL W P-5'-P-CCNC: 52 U/L (ref 12–78)
ANION GAP SERPL CALCULATED.3IONS-SCNC: 8 MMOL/L (ref 4–13)
AST SERPL W P-5'-P-CCNC: 47 U/L (ref 5–45)
BILIRUB SERPL-MCNC: 0.23 MG/DL (ref 0.2–1)
BUN SERPL-MCNC: 8 MG/DL (ref 5–25)
CALCIUM ALBUM COR SERPL-MCNC: 8 MG/DL (ref 8.3–10.1)
CALCIUM SERPL-MCNC: 6.7 MG/DL (ref 8.3–10.1)
CHLORIDE SERPL-SCNC: 105 MMOL/L (ref 96–108)
CO2 SERPL-SCNC: 23 MMOL/L (ref 21–32)
CREAT SERPL-MCNC: 0.68 MG/DL (ref 0.6–1.3)
ERYTHROCYTE [DISTWIDTH] IN BLOOD BY AUTOMATED COUNT: 13.2 % (ref 11.6–15.1)
GFR SERPL CREATININE-BSD FRML MDRD: 91 ML/MIN/1.73SQ M
GLUCOSE SERPL-MCNC: 275 MG/DL (ref 65–140)
HCT VFR BLD AUTO: 36.4 % (ref 34.8–46.1)
HGB BLD-MCNC: 12.2 G/DL (ref 11.5–15.4)
MAGNESIUM SERPL-MCNC: 2.5 MG/DL (ref 1.6–2.6)
MCH RBC QN AUTO: 29 PG (ref 26.8–34.3)
MCHC RBC AUTO-ENTMCNC: 33.5 G/DL (ref 31.4–37.4)
MCV RBC AUTO: 87 FL (ref 82–98)
PLATELET # BLD AUTO: 298 THOUSANDS/UL (ref 149–390)
PMV BLD AUTO: 9.3 FL (ref 8.9–12.7)
POTASSIUM SERPL-SCNC: 4.2 MMOL/L (ref 3.5–5.3)
PROT SERPL-MCNC: 5.6 G/DL (ref 6.4–8.4)
RBC # BLD AUTO: 4.2 MILLION/UL (ref 3.81–5.12)
SODIUM SERPL-SCNC: 136 MMOL/L (ref 135–147)
WBC # BLD AUTO: 10.87 THOUSAND/UL (ref 4.31–10.16)

## 2023-04-29 RX ORDER — ONDANSETRON 4 MG/1
4 TABLET, FILM COATED ORAL EVERY 8 HOURS PRN
Qty: 20 TABLET | Refills: 0 | Status: SHIPPED | OUTPATIENT
Start: 2023-04-29

## 2023-04-29 RX ORDER — ONDANSETRON 2 MG/ML
4 INJECTION INTRAMUSCULAR; INTRAVENOUS EVERY 4 HOURS PRN
Status: DISCONTINUED | OUTPATIENT
Start: 2023-04-29 | End: 2023-04-29 | Stop reason: HOSPADM

## 2023-04-29 RX ORDER — TRAMADOL HYDROCHLORIDE 50 MG/1
50 TABLET ORAL EVERY 6 HOURS PRN
Qty: 12 TABLET | Refills: 0 | Status: SHIPPED | OUTPATIENT
Start: 2023-04-29 | End: 2023-05-09

## 2023-04-29 RX ORDER — IBUPROFEN 400 MG/1
800 TABLET ORAL EVERY 6 HOURS PRN
Refills: 0 | COMMUNITY
Start: 2023-04-29

## 2023-04-29 RX ADMIN — ONDANSETRON 4 MG: 2 INJECTION INTRAMUSCULAR; INTRAVENOUS at 05:22

## 2023-04-29 RX ADMIN — HYDROMORPHONE HYDROCHLORIDE 0.2 MG: 0.2 INJECTION, SOLUTION INTRAMUSCULAR; INTRAVENOUS; SUBCUTANEOUS at 01:51

## 2023-04-29 RX ADMIN — CEFAZOLIN SODIUM 2000 MG: 2 SOLUTION INTRAVENOUS at 05:09

## 2023-04-29 RX ADMIN — METRONIDAZOLE 500 MG: 500 INJECTION, SOLUTION INTRAVENOUS at 05:49

## 2023-04-29 RX ADMIN — HEPARIN SODIUM 5000 UNITS: 5000 INJECTION INTRAVENOUS; SUBCUTANEOUS at 05:09

## 2023-04-29 RX ADMIN — HYDROMORPHONE HYDROCHLORIDE 2 MG: 2 TABLET ORAL at 05:22

## 2023-04-29 RX ADMIN — ONDANSETRON 4 MG: 2 INJECTION INTRAMUSCULAR; INTRAVENOUS at 09:08

## 2023-04-29 NOTE — PROGRESS NOTES
"General Surgery  Progress Note   Bal Gramajo 79 y o  female MRN: [id number]  Unit/Bed#: CW2 214-01 Encounter: [id number]    Assessment:  79year old female with acute cholecystitis now s/p  lap venice    Vital signs stable, afebrile  WBC 10 87 (10 19)  Hgb 12 2 (14 7)  CMP pending    Plan:   Diet as tolerated    Discontinue IVF    DVT ppx: Heparin   Pain/ nausea control PRN   OOB/ ambulation   Incentive Spirometry   Can likely be discharged this morning vs early afternoon      Subjective/Objective     Subjective:   Patient seen and examined at bedside, in no acute distress  Had vomiting post operatively but resolved overnight  No further nausea  Pain controlled  Objective:   Vitals:Blood pressure 119/59, pulse 81, temperature 98 4 °F (36 9 °C), resp  rate 19, height 4' 9\" (1 448 m), SpO2 91 %  Temp (24hrs), Av °F (37 2 °C), Min:98 1 °F (36 7 °C), Max:99 8 °F (37 7 °C)        Intake/Output Summary (Last 24 hours) at 2023 0737  Last data filed at 2023 7740  Gross per 24 hour   Intake 500 ml   Output --   Net 500 ml       Invasive Devices     Peripheral Intravenous Line  Duration           Peripheral IV 23 Distal;Right;Ventral (anterior) Forearm 1 day                Physical Exam:  General: No acute distress, alert and oriented  CV: Well perfused, regular rate and rhythm  Lungs: Normal work of breathing, no increased respiratory effort   Abdomen: Soft, appropriately tender, non distended  Incisions clean, dry and intact    Extremities: No edema, clubbing or cyanosis  Skin: Warm, dry    Lab Results:   BMP/CMP: No results found for: SODIUM, K, CL, CO2, ANIONGAP, BUN, CREATININE, GLUCOSE, CALCIUM, AST, ALT, ALKPHOS, PROT, BILITOT, EGFR and CBC:   Lab Results   Component Value Date    WBC 10 87 (H) 2023    HGB 12 2 2023    HCT 36 4 2023    MCV 87 2023     2023    MCH 29 0 2023    MCHC 33 5 2023    RDW 13 2 2023    MPV 9 3 " 04/29/2023     VTE Prophylaxis: Sequential compression device (Venodyne)  and Heparin    Nicole Smyth MD  4/29/2023

## 2023-04-29 NOTE — OP NOTE
OPERATIVE REPORT  PATIENT NAME: Calros Root    :  1955  MRN: [id number]  Pt Location: [facility] OR ROOM 07    SURGERY DATE: 2023    Surgeon(s) and Role:     * Norberto Parekh MD - Primary     * Francheska Curry MD - Assisting    Preop Diagnosis:  Acute calculous cholecystitis [K80 00]    Post-Op Diagnosis Codes:     * Acute calculous cholecystitis [K80 00]    Procedure(s):  CHOLECYSTECTOMY LAPAROSCOPIC    Specimen(s):  ID Type Source Tests Collected by Time Destination   1 :  Tissue Gallbladder TISSUE EXAM Norberto Parekh MD 2023 1832        Estimated Blood Loss:   Minimal    Drains:  * No LDAs found *    Anesthesia Type:   General    Operative Indications:  Acute calculous cholecystitis [K80 00]    Operative Findings:  Acute calculous cholecystitis    Complications:   None    Procedure and Technique:  After obtaining informed written consent, the patient was brought back to the operating room and placed in the supine position on the operating room table with both arms extended  Bilateral lower extremity SCDs were placed, preoperative antibiotics had already been administered, and the patient underwent uneventful induction of general anesthesia  The abdomen was prepped/draped in the usual sterile fashion and a universal timeout was undertaken where the patient's identity and proposed procedure were confirmed by all in the room  0 25% Marcaine with epinephrine was infiltrated into the infraumbilical region and a vertical incision was made  The underlying fascia was identified/incised, the peritoneum was entered, and under direct visualization an 11mm Pearl trocar was placed  Pneumoperitoneum was titrated to 15mmHg and initial inspection with the laparoscopic camera revealed no injury from entry and an abdomen suitable for laparoscopic surgery   The patient was positioned in reverse Trendelenburg with her right side up, additional local anesthetic was infiltrated at all anticipated trocar sites (for a total of 30 cc), and under direct visualization three 5mm trocars were placed in the epigastrium, right mid upper quadrant, and right lateral upper quadrant  The gallbladder has visible inflammation with a palpable stone in the infundibulum/neck  The dome was retracted cephalad over the liver, the infundibulum was retracted laterally, and by use of both the [place] dissector and hook electrocautery, the triangle of [place] was cleared of all fat/fibrous tissue, the lower third of the gallbladder was dissected off the hepatic plate, and only two structures were seen emanating from the gallbladder with a large node of [place] overlying the cystic artery  Once this critical view was obtained, two 5mm metallic clips were placed proximally on the cystic duct/artery, one 5mm metallic clip was placed distally on the cystic duct/artery, and both were transected sharply with the laparoscopic scissors  Unfortunately, due to a short cystic duct the metallic clip placed on the specimen side of the cystic duct became dislodged resulting in spillage of bile  The gallbladder was completely removed from the hepatic plate with electrocautery, the gallbladder was extracted via Endo Catch bag, and the right upper quadrant was copiously irrigated/evacuated  A final inspection revealed clips still in good positioning with no bleeding nor bile staining  Following the confirmation of instrument/sponge/sharp counts, all trocars were removed under direct visualization and peritoneum was completely evacuated  The infraumbilical fascial defect was reapproximated with 0 Vicryl in a figure-of-eight fashion, each skin incision was closed with 4-0 Monocryl/skin glue, the patient was uneventfully extubated, and she was transported to the PACU in stable condition with no immediate complications noted  Dr Lakesha Phliippe was present and scrubbed for the entire procedure      Patient Disposition:  PACU         SIGNATURE: Brittney Vogel MD  DATE: April 28, 2023  TIME: 8:25 PM

## 2023-04-29 NOTE — QUICK NOTE
"Post-op check - General Surgery  Burleson Raphael 79 y o  female MRN: [id number]  Unit/Bed#: CW2 214-01 Encounter: [id number]    Assessment:  79 y o  female with acute cholecystitis now s/p lap venice 4/28    Plan:  - Diet Regular; Regular House; Lo Fat  - Pain and Nausea control PRN  - Incentive spirometry  - OOB, ambulate  - plan for discharge tomorrow  - Please TigerText the surgery resident or AP role with any questions  Subjective/Objective     Subjective: Went to evaluate the patient after arrival to the floor  The patient's pain is well-controlled currently  Has some nausea which is typical for her after surgery  Objective:   Vitals: Blood pressure 149/77, pulse 70, temperature 99 5 °F (37 5 °C), resp  rate 16, height 4' 9\" (1 448 m), SpO2 93 %  ,Body mass index is 38 52 kg/m²  I/O       04/27 0701  04/28 0700 04/28 0701 04/29 0700    I  V   500    Total Intake  500    Net  +500          Unmeasured Urine Occurrence  3 x          Physical Exam:  General: No acute distress  Neuro: alert and oriented  HEENT: moist mucous membranes  CV: Well perfused, regular rate and rhythm  Lungs: Normal work of breathing, no increased respiratory effort  Abdomen: Soft, non-tender, non-distended  Incisions clean, dry and intact    Extremities: No edema, clubbing or cyanosis  Skin: Warm, dry    "

## 2023-05-01 NOTE — UTILIZATION REVIEW
NOTIFICATION OF EMERGENT OUTPATIENT PROCEDURE   AUTHORIZATION REQUEST   SERVICING FACILITY:   [facility]  Address: [address] [place] [address]  Tax ID: [id number]  NPI: [id number] ATTENDING PROVIDER:  Attending Name and NPI#: Lucía Moser [[id number]]  Address: [address] [place] [address]  Phone: [phone]   ADMISSION INFORMATION:  Place of Service: On [address] [place] [address] Code: 22 CPT Code:   Patient presented to the ED and had an outpatient procedure     OUTPATIENT PROCEDURE INFORMATION  Surgery Date: 4/28/2023  Discharge Date/Time: 4/29/2023  5:19 PM  Patient Preop Diagnosis:   Acute calculous cholecystitis [K80 00] Post-Op Diagnosis Codes:     * Acute calculous cholecystitis [K80 00]  Operative Indications:   Acute calculous cholecystitis [K80 00]    Procedures:    * CHOLECYSTECTOMY LAPAROSCOPIC outpatient procedure 06304     UTILIZATION REVIEW CONTACT:  [facility] Utilization   Network Utilization Review Department  Phone: [phone]  Fax: [phone]  Email: [email or web address]  Contact for approvals/pending authorizations, clinical reviews, and discharge  PHYSICIAN ADVISORY SERVICES:  Medical Necessity Denial & Eqbk-vc-Dnfq Review  Phone: [phone]  Fax: [phone]  Email: [email or web address] ([email or web address] [email or web address]  [email or web address]

## 2024-01-22 ENCOUNTER — APPOINTMENT (OUTPATIENT)
Dept: LAB | Facility: CLINIC | Age: 69
End: 2024-01-22
Payer: COMMERCIAL

## 2024-01-22 ENCOUNTER — TELEPHONE (OUTPATIENT)
Age: 69
End: 2024-01-22

## 2024-01-22 ENCOUNTER — HOSPITAL ENCOUNTER (OUTPATIENT)
Dept: ULTRASOUND IMAGING | Facility: HOSPITAL | Age: 69
Discharge: HOME | End: 2024-01-22
Payer: COMMERCIAL

## 2024-01-22 DIAGNOSIS — R10.9 ABDOMINAL PAIN, UNSPECIFIED ABDOMINAL LOCATION: ICD-10-CM

## 2024-01-22 DIAGNOSIS — E11.00 TYPE II DIABETES MELLITUS WITH HYPEROSMOLARITY, UNCONTROLLED (HCC): ICD-10-CM

## 2024-01-22 DIAGNOSIS — E11.65 TYPE II DIABETES MELLITUS WITH HYPEROSMOLARITY, UNCONTROLLED (HCC): ICD-10-CM

## 2024-01-22 DIAGNOSIS — E78.5 HYPERLIPIDEMIA, UNSPECIFIED HYPERLIPIDEMIA TYPE: ICD-10-CM

## 2024-01-22 LAB
ALBUMIN SERPL BCP-MCNC: 4.1 G/DL (ref 3.5–5)
ALP SERPL-CCNC: 81 U/L (ref 34–104)
ALT SERPL W P-5'-P-CCNC: 13 U/L (ref 7–52)
ANION GAP SERPL CALCULATED.3IONS-SCNC: 4 MMOL/L
AST SERPL W P-5'-P-CCNC: 12 U/L (ref 13–39)
BILIRUB SERPL-MCNC: 0.6 MG/DL (ref 0.2–1)
BUN SERPL-MCNC: 12 MG/DL (ref 5–25)
CALCIUM SERPL-MCNC: 9.2 MG/DL (ref 8.4–10.2)
CHLORIDE SERPL-SCNC: 105 MMOL/L (ref 96–108)
CHOLEST SERPL-MCNC: 224 MG/DL
CO2 SERPL-SCNC: 29 MMOL/L (ref 21–32)
CREAT SERPL-MCNC: 0.73 MG/DL (ref 0.6–1.3)
CREAT UR-MCNC: 225.7 MG/DL
EST. AVERAGE GLUCOSE BLD GHB EST-MCNC: 206 MG/DL
GFR SERPL CREATININE-BSD FRML MDRD: 85 ML/MIN/1.73SQ M
GLUCOSE P FAST SERPL-MCNC: 177 MG/DL (ref 65–99)
HBA1C MFR BLD: 8.8 %
HDLC SERPL-MCNC: 50 MG/DL
LDLC SERPL CALC-MCNC: 144 MG/DL (ref 0–100)
NONHDLC SERPL-MCNC: 174 MG/DL
POTASSIUM SERPL-SCNC: 4.2 MMOL/L (ref 3.5–5.3)
PROT SERPL-MCNC: 7.2 G/DL (ref 6.4–8.4)
PROT UR-MCNC: 16 MG/DL
PROT/CREAT UR: 0.07 MG/G{CREAT} (ref 0–0.1)
SODIUM SERPL-SCNC: 138 MMOL/L (ref 135–147)
TRIGL SERPL-MCNC: 152 MG/DL

## 2024-01-22 PROCEDURE — 84156 ASSAY OF PROTEIN URINE: CPT

## 2024-01-22 PROCEDURE — 80061 LIPID PANEL: CPT

## 2024-01-22 PROCEDURE — 82570 ASSAY OF URINE CREATININE: CPT

## 2024-01-22 PROCEDURE — 80053 COMPREHEN METABOLIC PANEL: CPT

## 2024-01-22 PROCEDURE — 76700 US EXAM ABDOM COMPLETE: CPT

## 2024-01-22 PROCEDURE — 83036 HEMOGLOBIN GLYCOSYLATED A1C: CPT

## 2024-01-22 PROCEDURE — 36415 COLL VENOUS BLD VENIPUNCTURE: CPT

## 2024-01-22 NOTE — TELEPHONE ENCOUNTER
Patient daughter calling as patient was advised to schedule a EGD/Colonoscopy/ Pt was not sure why but stated she is having stomach pains for a long time. She will call back to her doctor to get more info and callback to schedule

## 2024-01-24 ENCOUNTER — OFFICE VISIT (OUTPATIENT)
Dept: DENTISTRY | Facility: CLINIC | Age: 69
End: 2024-01-24

## 2024-01-24 VITALS — SYSTOLIC BLOOD PRESSURE: 135 MMHG | DIASTOLIC BLOOD PRESSURE: 63 MMHG | HEART RATE: 76 BPM

## 2024-01-24 DIAGNOSIS — Z01.21 ENCOUNTER FOR DENTAL EXAMINATION AND CLEANING WITH ABNORMAL FINDINGS: Primary | ICD-10-CM

## 2024-01-24 PROCEDURE — D0210 INTRAORAL - COMPLETE SERIES OF RADIOGRAPHIC IMAGES: HCPCS

## 2024-01-24 RX ORDER — NIRMATRELVIR AND RITONAVIR 300-100 MG
KIT ORAL
COMMUNITY
Start: 2024-01-04

## 2024-01-24 NOTE — DENTAL PROCEDURE DETAILS
Patient presents for FMX     CC: Patient would like to be a regular patient in the clinic.    HHX reviewed  ASA:III / Uncontrolled diabetes of 11.6  Diagnosed with type II Diabetes 5 days ago and is currently taking Metformin 500 mg 1 tab per day. Patient didn't take it today.    Radiographs taken: FMX obtained by [facility] student    Dr. Diaz viewed x-rays and patient is returning for perio charting with a LEENA      Pt dismissed in good health, no complications and all questions answered.     NV: LEENA and perio charting

## 2024-01-29 ENCOUNTER — HOSPITAL ENCOUNTER (OUTPATIENT)
Dept: RADIOLOGY | Facility: HOSPITAL | Age: 69
Discharge: HOME | End: 2024-01-29
Payer: COMMERCIAL

## 2024-01-29 DIAGNOSIS — R51.9 HEADACHE, UNSPECIFIED: ICD-10-CM

## 2024-01-29 PROCEDURE — A9585 GADOBUTROL INJECTION: HCPCS | Performed by: FAMILY MEDICINE

## 2024-01-29 PROCEDURE — G1004 CDSM NDSC: HCPCS

## 2024-01-29 PROCEDURE — 70553 MRI BRAIN STEM W/O & W/DYE: CPT

## 2024-01-29 RX ORDER — GADOBUTROL 604.72 MG/ML
7.5 INJECTION INTRAVENOUS
Status: COMPLETED | OUTPATIENT
Start: 2024-01-29 | End: 2024-01-29

## 2024-01-29 RX ADMIN — GADOBUTROL 7.5 ML: 604.72 INJECTION INTRAVENOUS at 08:41

## 2024-02-01 ENCOUNTER — OFFICE VISIT (OUTPATIENT)
Dept: DENTISTRY | Facility: CLINIC | Age: 69
End: 2024-02-01

## 2024-02-01 VITALS — HEART RATE: 77 BPM | SYSTOLIC BLOOD PRESSURE: 131 MMHG | DIASTOLIC BLOOD PRESSURE: 76 MMHG

## 2024-02-01 DIAGNOSIS — Z01.20 ENCOUNTER FOR DENTAL EXAMINATION: Primary | ICD-10-CM

## 2024-02-01 PROBLEM — N20.0 KIDNEY STONE: Status: ACTIVE | Noted: 2017-02-28

## 2024-02-01 PROBLEM — E11.9 TYPE 2 DIABETES MELLITUS (HCC): Status: ACTIVE | Noted: 2024-01-23

## 2024-02-01 PROCEDURE — D0150 COMPREHENSIVE ORAL EVALUATION - NEW OR ESTABLISHED PATIENT: HCPCS

## 2024-02-01 RX ORDER — OMEPRAZOLE 20 MG/1
CAPSULE, DELAYED RELEASE ORAL
COMMUNITY
Start: 2024-01-30

## 2024-02-01 RX ORDER — DIAZEPAM 5 MG/1
TABLET ORAL
COMMUNITY
Start: 2024-01-30

## 2024-02-01 NOTE — DENTAL PROCEDURE DETAILS
Vandana Joiner presents for a Comprehensive exam. Verbal consent for treatment given in addition to the forms.    CC: Patient presents w/ broken maxillary partial denture. She has been wearing it broken for a long time. Originally was made in [place]. Patient does not have a mandibular partial denture but would like one.      Reviewed health history - Patient is ASA II  Consents signed: Yes     Perio: 4B  Pain Scale: 0  Caries Assessment: High  Radiographs: None, FMX was taken at previous visit.     Dr Frank Exam:  6 subgingival decay under crown  11 subgingival decay, missing existing crown for approx 2 years  14  decay  18 MBL decay  22 FV winnie  27 MB winnie  29 DBL decay    Recommend extraction of #6,11 and 14 with Immediate Complete Denture (patient does not want to be without teeth)    Recommend extraction of 18 and 29 with fabrication of Mandibular metal based partial denture.    Preauth for Max Denture and Tru RPD sent out today.    NV: #22 FV and 27 MB winnie  2) Prelim Impressions for Maxillary immediate denture and mandibular partial denture.

## 2024-02-05 ENCOUNTER — CONSULT (OUTPATIENT)
Dept: GASTROENTEROLOGY | Facility: AMBULARY SURGERY CENTER | Age: 69
End: 2024-02-05
Payer: COMMERCIAL

## 2024-02-05 VITALS
HEART RATE: 72 BPM | HEIGHT: 57 IN | WEIGHT: 166.6 LBS | BODY MASS INDEX: 35.94 KG/M2 | DIASTOLIC BLOOD PRESSURE: 76 MMHG | SYSTOLIC BLOOD PRESSURE: 134 MMHG | OXYGEN SATURATION: 98 %

## 2024-02-05 DIAGNOSIS — Z90.49 S/P CHOLECYSTECTOMY: ICD-10-CM

## 2024-02-05 DIAGNOSIS — R19.4 CHANGE IN BOWEL HABITS: ICD-10-CM

## 2024-02-05 DIAGNOSIS — Z86.010 HISTORY OF COLON POLYPS: ICD-10-CM

## 2024-02-05 DIAGNOSIS — R10.10 PAIN OF UPPER ABDOMEN: Primary | ICD-10-CM

## 2024-02-05 DIAGNOSIS — K21.9 GASTRO-ESOPHAGEAL REFLUX DISEASE WITHOUT ESOPHAGITIS: ICD-10-CM

## 2024-02-05 PROBLEM — K81.0 CHOLECYSTITIS, ACUTE: Status: RESOLVED | Noted: 2023-04-28 | Resolved: 2024-02-05

## 2024-02-05 PROCEDURE — 99204 OFFICE O/P NEW MOD 45 MIN: CPT | Performed by: INTERNAL MEDICINE

## 2024-02-05 RX ORDER — MONTELUKAST SODIUM 4 MG/1
1 TABLET, CHEWABLE ORAL 2 TIMES DAILY
Qty: 180 TABLET | Refills: 3 | Status: SHIPPED | OUTPATIENT
Start: 2024-02-05

## 2024-02-05 NOTE — PATIENT INSTRUCTIONS
Scheduled date of EGD/colonoscopy (as of today): March 6, 2024  Physician performing EGD/colonoscopy: Dr. Raza  Location of EGD/colonoscopy: [facility]  Desired bowel prep reviewed with patient: Clenpiq  Instructions reviewed with patient by: Davina CRUZ  Clearances:  n/a    - start colestipol   - take 1 tab daily before a meal  - after 3 days, if diarrhea not better and NO constipation, increase to taking twice daily (before meals)  - try to space out from other medications

## 2024-02-05 NOTE — PROGRESS NOTES
[facility] - Outpatient Consultation  Vandana Joiner 69 y.o. female MRN: [id number]  Encounter: [id number]      PCP: Adam Lopes DO  Referring: No referring provider defined for this encounter.      ASSESSMENT AND PLAN:      1. Pain of upper abdomen  2. Gastro-esophageal reflux disease without esophagitis  Upper abdominal pain of unclear etiology,  May be related to erosive esophagitis/gastritis, peptic ulcer disease, gastroparesis or other  Symptoms resolved with PPI twice daily, continue therapy for now  Recommend to avoid and limit NSAIDs  - EGD; Future, rule out malignancy and evaluate for above, including H pylori infection or other  - consider gastric emptying study    3. Change in bowel habits  4. S/P cholecystectomy  Postprandial fecal urgency, which is chronic ongoing for greater than the last 12 months  Symptoms began status post cholecystectomy, suspect bile acid diarrhea  - EGD; Future, rule out celiac  - Colonoscopy; Future, rule out microscopic colitis, malignancy or other  - sodium picosulfate, magnesium oxide, citric acid (Clenpiq) oral solution; Take 175 mL (1 bottle) the evening before the colonoscopy, between 5 PM and 9 PM, followed by a second 175 mL bottle 5 hours before the colonoscopy.  Dispense: 350 mL; Refill: 0  - colestipol (COLESTID) 1 g tablet; Take 1 tablet (1 g total) by mouth 2 (two) times a day  Dispense: 180 tablet; Refill: 3, empiric treatment for bile acid diarrhea    5. History of colon polyps  Last colonoscopy 2018, recall 5 years    ______________________________________________________________________    CC:  Chief Complaint   Patient presents with    Abdominal Pain    Diarrhea       HPI:      Patient is a 69-year-old female who is referred for upper abdominal pain, diarrhea.  She has GERD, T2DM (last HbA1c 8.8%) recently started on metformin, arthritis, vitamin D deficiency, depression/fatigue, BMI 36. S/p cholecystectomy April 2023 for  acute calculus cholecystitis.  Since then she has been having fecal urgency after eating.  She has bowel movements 3-4 times a day.  3 weeks ago she started to experience constant upper abdominal pain.  She does have a history of peptic ulcer disease and was previously on omeprazole, discontinued this for 7 to 8 months prior to onset of pain.  She has restarted her omeprazole twice a day with resolution of her upper abdominal pain.  She reports unintentional weight loss.  She is also recently started metformin the last 2 weeks after recent diagnosis of diabetes.    RUQ us -2024- s/p cholecystectomy; unremarkable  Colonoscopy -Dr. Lee- GEOVANNY personally reviewed external reports.  2 subcentimeter colon polyps, recall 5 years    REVIEW OF SYSTEMS:    CONSTITUTIONAL: Denies any fever, chills, rigors, and weight loss.  HEENT: No earache or tinnitus. Denies hearing loss or visual disturbances.  CARDIOVASCULAR: No chest pain or palpitations.   RESPIRATORY: Denies any cough, hemoptysis, shortness of breath or dyspnea on exertion.  GASTROINTESTINAL: As noted in the History of Present Illness.   GENITOURINARY: No problems with urination. Denies any hematuria or dysuria.  NEUROLOGIC: No dizziness or vertigo, denies headaches.   MUSCULOSKELETAL: Denies any muscle or joint pain.   SKIN: Denies skin rashes or itching.   ENDOCRINE: Denies excessive thirst. Denies intolerance to heat or cold.  PSYCHOSOCIAL: Denies depression or anxiety. Denies any recent memory loss.       Historical Information   Past Medical History:   Diagnosis Date    Arthritis     GERD (gastroesophageal reflux disease)     Kidney stone     Meningioma (HCC)     Patient states she has had no traetment for this     Past Surgical History:   Procedure Laterality Date    ABDOMINAL SURGERY      ANTERIOR CRUCIATE LIGAMENT REPAIR Right     CARPAL TUNNEL RELEASE Bilateral      SECTION      x2    CHOLECYSTECTOMY LAPAROSCOPIC N/A 2023    Procedure:  "CHOLECYSTECTOMY LAPAROSCOPIC;  Surgeon: Luc Cuadra MD;  Location: [facility];  Service: General    ESOPHAGOGASTRODUODENOSCOPY      KIDNEY STONE SURGERY      [place] COLONOSCOPY FLX DX W/COLLJ SPEC WHEN PFRMD N/A 2018    Procedure: COLONOSCOPY;  Surgeon: Maurice Lee MD;  Location: [facility];  Service: Colorectal    ROTATOR CUFF REPAIR Right      Social History   Social History     Substance and Sexual Activity   Alcohol Use No     Social History     Substance and Sexual Activity   Drug Use No     Social History     Tobacco Use   Smoking Status Former    Current packs/day: 0.00    Types: Cigarettes    Quit date:     Years since quittin.1   Smokeless Tobacco Never     History reviewed. No pertinent family history.    Meds/Allergies       Current Outpatient Medications:     diazepam (VALIUM) 10 mg tablet    diazepam (VALIUM) 5 mg tablet    ibuprofen (MOTRIN) 400 mg tablet    meloxicam (MOBIC) 7.5 mg tablet    metFORMIN (GLUCOPHAGE) 500 mg tablet    omeprazole (PriLOSEC) 20 mg delayed release capsule    ondansetron (ZOFRAN) 4 mg tablet    Paxlovid, 300/100, tablet therapy pack    Allergies   Allergen Reactions    Tapentadol Anaphylaxis    Acetaminophen Other (See Comments)    Hydrocodone Other (See Comments)    Hydrocodone-Acetaminophen Other (See Comments)    Meperidine Other (See Comments)    Oxycodone Rash    Percocet [Oxycodone-Acetaminophen] Rash           Objective     Blood pressure 134/76, pulse 72, height 4' 9\" (1.448 m), weight 75.6 kg (166 lb 9.6 oz), SpO2 98%. Body mass index is 36.05 kg/m².     PHYSICAL EXAM:      General Appearance:   Alert, cooperative, no distress   HEENT:   Normocephalic, atraumatic, anicteric.     Neck:  Supple, symmetrical, trachea midline   Lungs:   Clear to auscultation bilaterally; no rales, rhonchi or wheezing; respirations unlabored    Heart::   Regular rate and rhythm; no murmur, rub, or gallop.   Abdomen:   Soft, + generalized abdominal pain, worse in the right " "upper quadrant, epigastrium, non-distended; normal bowel sounds; no masses, no organomegaly    Genitalia:   Deferred    Rectal:   Deferred    Extremities:  No cyanosis, clubbing or edema    Pulses:  2+ and symmetric    Skin:  No jaundice, rashes, or lesions    Lymph nodes:  No palpable cervical lymphadenopathy        Lab Results:     Lab Results   Component Value Date    WBC 10.87 (H) 04/29/2023    HGB 12.2 04/29/2023    HCT 36.4 04/29/2023    MCV 87 04/29/2023     04/29/2023       Lab Results   Component Value Date     01/06/2014    K 4.2 01/22/2024     01/22/2024    CO2 29 01/22/2024    ANIONGAP 7 01/06/2014    BUN 12 01/22/2024    CREATININE 0.73 01/22/2024    GLUCOSE 125 01/06/2014    GLUF 177 (H) 01/22/2024    CALCIUM 9.2 01/22/2024    CORRECTEDCA 8.0 (L) 04/29/2023    AST 12 (L) 01/22/2024    ALT 13 01/22/2024    ALKPHOS 81 01/22/2024    PROT 7.3 01/06/2014    BILITOT 0.34 01/06/2014    EGFR 85 01/22/2024       No results found for: \"INR\", \"PROTIME\"    I personally reviewed relevant labs    Radiology Results:   US abdomen complete Result Date: 1/29/2024 -Cholecystectomy.  Otherwise unremarkable    I personally reviewed images on PACS.    Portions of the record may have been created with voice recognition software. Occasional wrong word or \"sound a like\" substitutions may have occurred due to the inherent limitations of voice recognition software. Read the chart carefully and recognize, using context, where substitutions have occurred.        "

## 2024-02-21 ENCOUNTER — ANESTHESIA (OUTPATIENT)
Dept: ANESTHESIOLOGY | Facility: HOSPITAL | Age: 69
End: 2024-02-21

## 2024-02-21 ENCOUNTER — ANESTHESIA EVENT (OUTPATIENT)
Dept: ANESTHESIOLOGY | Facility: HOSPITAL | Age: 69
End: 2024-02-21

## 2024-03-06 ENCOUNTER — ANESTHESIA (OUTPATIENT)
Dept: GASTROENTEROLOGY | Facility: AMBULARY SURGERY CENTER | Age: 69
End: 2024-03-06

## 2024-03-06 ENCOUNTER — ANESTHESIA EVENT (OUTPATIENT)
Dept: GASTROENTEROLOGY | Facility: AMBULARY SURGERY CENTER | Age: 69
End: 2024-03-06

## 2024-03-06 ENCOUNTER — HOSPITAL ENCOUNTER (OUTPATIENT)
Dept: GASTROENTEROLOGY | Facility: AMBULARY SURGERY CENTER | Age: 69
Setting detail: OUTPATIENT SURGERY
Discharge: HOME | End: 2024-03-06
Attending: INTERNAL MEDICINE
Payer: COMMERCIAL

## 2024-03-06 VITALS
TEMPERATURE: 98.5 F | SYSTOLIC BLOOD PRESSURE: 127 MMHG | RESPIRATION RATE: 18 BRPM | OXYGEN SATURATION: 99 % | HEART RATE: 65 BPM | DIASTOLIC BLOOD PRESSURE: 67 MMHG

## 2024-03-06 DIAGNOSIS — R19.4 CHANGE IN BOWEL HABITS: ICD-10-CM

## 2024-03-06 DIAGNOSIS — R10.10 PAIN OF UPPER ABDOMEN: ICD-10-CM

## 2024-03-06 DIAGNOSIS — K21.9 GASTRO-ESOPHAGEAL REFLUX DISEASE WITHOUT ESOPHAGITIS: ICD-10-CM

## 2024-03-06 DIAGNOSIS — Z90.49 S/P CHOLECYSTECTOMY: ICD-10-CM

## 2024-03-06 LAB — GLUCOSE SERPL-MCNC: 138 MG/DL (ref 65–140)

## 2024-03-06 PROCEDURE — 43239 EGD BIOPSY SINGLE/MULTIPLE: CPT | Performed by: INTERNAL MEDICINE

## 2024-03-06 PROCEDURE — 45380 COLONOSCOPY AND BIOPSY: CPT | Performed by: INTERNAL MEDICINE

## 2024-03-06 PROCEDURE — 88305 TISSUE EXAM BY PATHOLOGIST: CPT | Performed by: PATHOLOGY

## 2024-03-06 PROCEDURE — 82948 REAGENT STRIP/BLOOD GLUCOSE: CPT

## 2024-03-06 RX ORDER — SODIUM CHLORIDE, SODIUM LACTATE, POTASSIUM CHLORIDE, CALCIUM CHLORIDE 600; 310; 30; 20 MG/100ML; MG/100ML; MG/100ML; MG/100ML
INJECTION, SOLUTION INTRAVENOUS CONTINUOUS PRN
Status: DISCONTINUED | OUTPATIENT
Start: 2024-03-06 | End: 2024-03-06

## 2024-03-06 RX ORDER — PROPOFOL 10 MG/ML
INJECTION, EMULSION INTRAVENOUS AS NEEDED
Status: DISCONTINUED | OUTPATIENT
Start: 2024-03-06 | End: 2024-03-06

## 2024-03-06 RX ORDER — LIDOCAINE HYDROCHLORIDE 10 MG/ML
INJECTION, SOLUTION EPIDURAL; INFILTRATION; INTRACAUDAL; PERINEURAL AS NEEDED
Status: DISCONTINUED | OUTPATIENT
Start: 2024-03-06 | End: 2024-03-06

## 2024-03-06 RX ADMIN — SODIUM CHLORIDE, SODIUM LACTATE, POTASSIUM CHLORIDE, AND CALCIUM CHLORIDE: .6; .31; .03; .02 INJECTION, SOLUTION INTRAVENOUS at 08:12

## 2024-03-06 RX ADMIN — LIDOCAINE HYDROCHLORIDE 50 MG: 10 INJECTION, SOLUTION EPIDURAL; INFILTRATION; INTRACAUDAL; PERINEURAL at 08:17

## 2024-03-06 RX ADMIN — PROPOFOL 100 MG: 10 INJECTION, EMULSION INTRAVENOUS at 08:17

## 2024-03-06 RX ADMIN — PROPOFOL 120 MCG/KG/MIN: 10 INJECTION, EMULSION INTRAVENOUS at 08:18

## 2024-03-06 NOTE — H&P
History and Physical - [facility]  Vandana Joiner 69 y.o. female MRN: [id number]                  HPI: Vandana Joiner is a 69 y.o. year old female who presents for change in bowel habits, abdominal pain.      REVIEW OF SYSTEMS: Per the HPI, and otherwise unremarkable.    Historical Information   Past Medical History:   Diagnosis Date    Arthritis     GERD (gastroesophageal reflux disease)     Kidney stone     Meningioma (HCC)     Patient states she has had no traetment for this     Past Surgical History:   Procedure Laterality Date    ABDOMINAL SURGERY      ANTERIOR CRUCIATE LIGAMENT REPAIR Right     CARPAL TUNNEL RELEASE Bilateral      SECTION      x2    CHOLECYSTECTOMY LAPAROSCOPIC N/A 2023    Procedure: CHOLECYSTECTOMY LAPAROSCOPIC;  Surgeon: Luc Cuadra MD;  Location: [facility];  Service: General    ESOPHAGOGASTRODUODENOSCOPY      KIDNEY STONE SURGERY      [place] COLONOSCOPY FLX DX W/COLLJ SPEC WHEN PFRMD N/A 2018    Procedure: COLONOSCOPY;  Surgeon: Maurice Lee MD;  Location: [facility];  Service: Colorectal    ROTATOR CUFF REPAIR Right      Social History   Social History     Substance and Sexual Activity   Alcohol Use No     Social History     Substance and Sexual Activity   Drug Use No     Social History     Tobacco Use   Smoking Status Former    Current packs/day: 0.00    Types: Cigarettes    Quit date:     Years since quittin.1   Smokeless Tobacco Never     No family history on file.    Meds/Allergies       Current Outpatient Medications:     meloxicam (MOBIC) 7.5 mg tablet    metFORMIN (GLUCOPHAGE) 500 mg tablet    omeprazole (PriLOSEC) 20 mg delayed release capsule    sodium picosulfate, magnesium oxide, citric acid (Clenpiq) oral solution    colestipol (COLESTID) 1 g tablet    diazepam (VALIUM) 10 mg tablet    diazepam (VALIUM) 5 mg tablet    ibuprofen (MOTRIN) 400 mg tablet    ondansetron (ZOFRAN) 4 mg tablet    Paxlovid, 300/100, tablet therapy  pack    Allergies   Allergen Reactions    Tapentadol Anaphylaxis    Acetaminophen Other (See Comments)    Hydrocodone Other (See Comments)    Hydrocodone-Acetaminophen Vomiting    Meperidine Facial Swelling    Oxycodone Rash    Percocet [Oxycodone-Acetaminophen] Rash       Objective     /63   Pulse 72   Temp 98.5 °F (36.9 °C) (Temporal)   Resp 16   SpO2 99%       PHYSICAL EXAM    Gen: NAD  Head: NCAT  CV: RRR  CHEST: Clear  ABD: soft, NT/ND  EXT: no edema      ASSESSMENT/PLAN:  This is a 69 y.o. year old female here for EGD & colonoscopy, and she is stable and optimized for her procedure.

## 2024-03-06 NOTE — ANESTHESIA PREPROCEDURE EVALUATION
Procedure:  COLONOSCOPY  EGD    Relevant Problems   ENDO   (+) Type 2 diabetes mellitus (HCC)      GI/HEPATIC   (+) Gastro-esophageal reflux disease without esophagitis      /RENAL   (+) Kidney stone      MUSCULOSKELETAL   (+) Arthritis   (+) Backache   (+) Dorsalgia   (+) Osteoarthritis      NEURO/PSYCH   (+) Depression   (+) Headache      Bmi 36    Physical Exam    Airway    Mallampati score: II  TM Distance: <3 FB  Neck ROM: full     Dental    upper dentures    Cardiovascular      Pulmonary      Other Findings  post-pubertal.      Anesthesia Plan  ASA Score- 2     Anesthesia Type- IV sedation with anesthesia with ASA Monitors.         Additional Monitors:     Airway Plan:            Plan Factors-Exercise tolerance (METS): >4 METS.    Chart reviewed.    Patient summary reviewed.                  Induction- intravenous.    Postoperative Plan-     Informed Consent- Anesthetic plan and risks discussed with patient.  I personally reviewed this patient with the CRNA. Discussed and agreed on the Anesthesia Plan with the CRNA..

## 2024-03-06 NOTE — ANESTHESIA POSTPROCEDURE EVALUATION
Post-Op Assessment Note    CV Status:  Stable  Pain Score: 0    Pain management: adequate       Mental Status:  Sleepy and arousable   Hydration Status:  Stable   PONV Controlled:  None   Airway Patency:  Patent     Post Op Vitals Reviewed: Yes    No anethesia notable event occurred.    Staff: CRNA   Comments: spontaneously breathing, protecting airway, vss, fully endorsed to recoveyr w/o  AC              BP   101/54   Temp      Pulse  72   Resp   15   SpO2   99

## 2024-03-08 PROCEDURE — 88305 TISSUE EXAM BY PATHOLOGIST: CPT | Performed by: PATHOLOGY

## 2024-03-13 ENCOUNTER — TELEPHONE (OUTPATIENT)
Dept: DENTISTRY | Facility: CLINIC | Age: 69
End: 2024-03-13

## 2024-07-16 ENCOUNTER — APPOINTMENT (OUTPATIENT)
Dept: LAB | Facility: CLINIC | Age: 69
End: 2024-07-16
Payer: COMMERCIAL

## 2024-07-16 ENCOUNTER — TRANSCRIBE ORDERS (OUTPATIENT)
Dept: LAB | Facility: CLINIC | Age: 69
End: 2024-07-16

## 2024-07-16 DIAGNOSIS — E11.65 TYPE 2 DIABETES MELLITUS WITH HYPERGLYCEMIA, UNSPECIFIED WHETHER LONG TERM INSULIN USE (HCC): ICD-10-CM

## 2024-07-16 DIAGNOSIS — Z79.899 OTHER LONG TERM (CURRENT) DRUG THERAPY: ICD-10-CM

## 2024-07-16 DIAGNOSIS — E11.65 TYPE 2 DIABETES MELLITUS WITH HYPERGLYCEMIA, UNSPECIFIED WHETHER LONG TERM INSULIN USE (HCC): Primary | ICD-10-CM

## 2024-07-16 LAB
ANION GAP SERPL CALCULATED.3IONS-SCNC: 10 MMOL/L (ref 4–13)
BUN SERPL-MCNC: 14 MG/DL (ref 5–25)
CALCIUM SERPL-MCNC: 9 MG/DL (ref 8.4–10.2)
CHLORIDE SERPL-SCNC: 106 MMOL/L (ref 96–108)
CO2 SERPL-SCNC: 24 MMOL/L (ref 21–32)
CREAT SERPL-MCNC: 0.6 MG/DL (ref 0.6–1.3)
EST. AVERAGE GLUCOSE BLD GHB EST-MCNC: 169 MG/DL
GFR SERPL CREATININE-BSD FRML MDRD: 94 ML/MIN/1.73SQ M
GLUCOSE P FAST SERPL-MCNC: 155 MG/DL (ref 65–99)
HBA1C MFR BLD: 7.5 %
POTASSIUM SERPL-SCNC: 3.9 MMOL/L (ref 3.5–5.3)
SODIUM SERPL-SCNC: 140 MMOL/L (ref 135–147)

## 2024-07-16 PROCEDURE — 83036 HEMOGLOBIN GLYCOSYLATED A1C: CPT

## 2024-07-16 PROCEDURE — 80048 BASIC METABOLIC PNL TOTAL CA: CPT

## 2024-07-16 PROCEDURE — 36415 COLL VENOUS BLD VENIPUNCTURE: CPT

## 2024-07-23 ENCOUNTER — OFFICE VISIT (OUTPATIENT)
Dept: DENTISTRY | Facility: CLINIC | Age: 69
End: 2024-07-23

## 2024-07-23 DIAGNOSIS — Z01.21 ENCOUNTER FOR DENTAL EXAMINATION AND CLEANING WITH ABNORMAL FINDINGS: Primary | ICD-10-CM

## 2024-07-23 PROCEDURE — D0220 INTRAORAL - PERIAPICAL FIRST RADIOGRAPHIC IMAGE: HCPCS

## 2024-07-23 PROCEDURE — D0140 LIMITED ORAL EVALUATION - PROBLEM FOCUSED: HCPCS

## 2024-07-23 NOTE — DENTAL PROCEDURE DETAILS
"Emergency     Patient presents to [facility] for emergency dental appointment. PMH reviewed, no changes. ASA 2    Allergies   Acetaminophen, hydrocodone, oxycodone, mepiridone, oxycodone, percocet    S: CC: \"I have pain in the upper left that started two days ago\" I feel throbbing pain that is 6/10. My tooth is cold sensitive. I stopped chewing on my tooth due to pain.    Onset: 2 days ago  Provocation: Spontaneous  Palliation: Tylenol helps  Quality: Throbbing, dull  Region/Radiation: Upper left  Severity: 6/10  Timing: Lingering, 6 seconds to cold    O: PA taken of #14.   #14: (+) Percussion, (+) Cold, (-) Palpation, Grade 1 mobility      EO : No swelling  TMJ and mandibular range of motion WNL,   IO:  oral cancer screening completed, no significant findings or soft tissue abnormalities; no intraoral swellings indicative of acute infection.     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 Requires EXT  #14: Symptomatic apical periodontitis     P: Informed Pt that #14 is not savable with RCT and crown. Discussed pros, cons, and timelines related to options: 1) Extraction, 2) 2nd opinion Pt opted for option #1 due to timeline and financials.   RX:     Referral:     All questions and concerns addressed. Patient left comfortably and ambulatory.     Complications:     Attending: Dr Slick Marie    NV: Preliminary Immediate denture impressions visit / #14 EXT    Patient explained that preliminary denture impression should be taken once finances are figured out if patient wants immediate maxillary denture, or to extract 6, 11, 14 and wait 6 months for healing for conventional denture steps  "

## 2024-08-22 ENCOUNTER — TELEPHONE (OUTPATIENT)
Dept: DENTISTRY | Facility: CLINIC | Age: 69
End: 2024-08-22

## 2025-01-25 ENCOUNTER — APPOINTMENT (OUTPATIENT)
Dept: RADIOLOGY | Age: 70
End: 2025-01-25
Payer: COMMERCIAL

## 2025-01-25 DIAGNOSIS — R05.1 ACUTE COUGH: ICD-10-CM

## 2025-01-25 PROCEDURE — 71046 X-RAY EXAM CHEST 2 VIEWS: CPT

## (undated) DEVICE — ADHESIVE SKIN HIGH VISCOSITY EXOFIN 1ML

## (undated) DEVICE — TROCAR: Brand: KII FIOS FIRST ENTRY

## (undated) DEVICE — TROCAR: Brand: KII® SLEEVE

## (undated) DEVICE — ANTIBACTERIAL UNDYED BRAIDED (POLYGLACTIN 910), SYNTHETIC ABSORBABLE SUTURE: Brand: COATED VICRYL

## (undated) DEVICE — SUT MONOCRYL 4-0 PS-2 18 IN Y496G

## (undated) DEVICE — NEEDLE 25G X 1 1/2

## (undated) DEVICE — SYRINGE 10ML LL

## (undated) DEVICE — CHLORAPREP HI-LITE 26ML ORANGE

## (undated) DEVICE — GLOVE INDICATOR PI UNDERGLOVE SZ 7.5 BLUE

## (undated) DEVICE — TUBING SMOKE EVAC W/FILTRATION DEVICE PLUMEPORT ACTIV

## (undated) DEVICE — LIGAMAX 5 MM ENDOSCOPIC MULTIPLE CLIP APPLIER: Brand: LIGAMAX

## (undated) DEVICE — INTENDED FOR TISSUE SEPARATION, AND OTHER PROCEDURES THAT REQUIRE A SHARP SURGICAL BLADE TO PUNCTURE OR CUT.: Brand: BARD-PARKER SAFETY BLADES SIZE 15, STERILE

## (undated) DEVICE — Device: Brand: OMNICLOSE TROCAR SITE CLOSURE DEVICE

## (undated) DEVICE — GLOVE SRG BIOGEL 7

## (undated) DEVICE — PENCIL ELECTROSURG E-Z CLEAN -0035H

## (undated) DEVICE — ELECTRODE LAP L WIRE E-Z CLEAN 33CM -0100

## (undated) DEVICE — SUT VICRYL PLUS 0 UR-6 27IN VCP603H

## (undated) DEVICE — INSUFFLATION NEEDLE TO ESTABLISH PNEUMOPERITONEUM.: Brand: INSUFFLATION NEEDLE

## (undated) DEVICE — PACK PBDS LAP CHOLE RF